# Patient Record
Sex: FEMALE | Race: WHITE | NOT HISPANIC OR LATINO | Employment: OTHER | URBAN - METROPOLITAN AREA
[De-identification: names, ages, dates, MRNs, and addresses within clinical notes are randomized per-mention and may not be internally consistent; named-entity substitution may affect disease eponyms.]

---

## 2018-08-17 ENCOUNTER — HISTORICAL (OUTPATIENT)
Dept: ADMINISTRATIVE | Facility: HOSPITAL | Age: 68
End: 2018-08-17

## 2018-08-19 LAB — FINAL CULTURE: NORMAL

## 2022-05-31 ENCOUNTER — HOSPITAL ENCOUNTER (OUTPATIENT)
Facility: HOSPITAL | Age: 72
Discharge: HOME | End: 2022-06-01
Attending: EMERGENCY MEDICINE | Admitting: EMERGENCY MEDICINE
Payer: MEDICARE

## 2022-05-31 DIAGNOSIS — I20.89 STABLE ANGINA PECTORIS: ICD-10-CM

## 2022-05-31 DIAGNOSIS — R06.02 SHORTNESS OF BREATH: ICD-10-CM

## 2022-05-31 DIAGNOSIS — R06.02 SOB (SHORTNESS OF BREATH): ICD-10-CM

## 2022-05-31 DIAGNOSIS — I20.89 ANGINAL EQUIVALENT: Primary | ICD-10-CM

## 2022-05-31 LAB
ALBUMIN SERPL-MCNC: 3.7 GM/DL (ref 3.4–4.8)
ALBUMIN/GLOB SERPL: 1.1 RATIO (ref 1.1–2)
ALP SERPL-CCNC: 148 UNIT/L (ref 40–150)
ALT SERPL-CCNC: 12 UNIT/L (ref 0–55)
AST SERPL-CCNC: 10 UNIT/L (ref 5–34)
BASOPHILS # BLD AUTO: 0.04 X10(3)/MCL (ref 0–0.2)
BASOPHILS NFR BLD AUTO: 0.4 %
BILIRUBIN DIRECT+TOT PNL SERPL-MCNC: 0.2 MG/DL
BNP BLD-MCNC: 36.5 PG/ML
BUN SERPL-MCNC: 26.8 MG/DL (ref 9.8–20.1)
CALCIUM SERPL-MCNC: 10 MG/DL (ref 8.4–10.2)
CHLORIDE SERPL-SCNC: 104 MMOL/L (ref 98–107)
CO2 SERPL-SCNC: 24 MMOL/L (ref 23–31)
CREAT SERPL-MCNC: 1.26 MG/DL (ref 0.55–1.02)
EOSINOPHIL # BLD AUTO: 0.15 X10(3)/MCL (ref 0–0.9)
EOSINOPHIL NFR BLD AUTO: 1.3 %
ERYTHROCYTE [DISTWIDTH] IN BLOOD BY AUTOMATED COUNT: 12.7 % (ref 11.5–17)
GLOBULIN SER-MCNC: 3.5 GM/DL (ref 2.4–3.5)
GLUCOSE SERPL-MCNC: 298 MG/DL (ref 82–115)
HCT VFR BLD AUTO: 45.2 % (ref 37–47)
HGB BLD-MCNC: 15 GM/DL (ref 12–16)
IMM GRANULOCYTES # BLD AUTO: 0.06 X10(3)/MCL (ref 0–0.02)
IMM GRANULOCYTES NFR BLD AUTO: 0.5 % (ref 0–0.43)
INR BLD: 0.91 (ref 0–1.3)
LYMPHOCYTES # BLD AUTO: 2.67 X10(3)/MCL (ref 0.6–4.6)
LYMPHOCYTES NFR BLD AUTO: 23.8 %
MCH RBC QN AUTO: 29.9 PG (ref 27–31)
MCHC RBC AUTO-ENTMCNC: 33.2 MG/DL (ref 33–36)
MCV RBC AUTO: 90 FL (ref 80–94)
MONOCYTES # BLD AUTO: 0.72 X10(3)/MCL (ref 0.1–1.3)
MONOCYTES NFR BLD AUTO: 6.4 %
NEUTROPHILS # BLD AUTO: 7.6 X10(3)/MCL (ref 2.1–9.2)
NEUTROPHILS NFR BLD AUTO: 67.6 %
NRBC BLD AUTO-RTO: 0 %
PLATELET # BLD AUTO: 373 X10(3)/MCL (ref 130–400)
PMV BLD AUTO: 10 FL (ref 9.4–12.4)
POCT GLUCOSE: 213 MG/DL (ref 70–110)
POTASSIUM SERPL-SCNC: 4.8 MMOL/L (ref 3.5–5.1)
PROT SERPL-MCNC: 7.2 GM/DL (ref 5.8–7.6)
PROTHROMBIN TIME: 12 SECONDS (ref 12.5–14.5)
RBC # BLD AUTO: 5.02 X10(6)/MCL (ref 4.2–5.4)
SARS-COV-2 RDRP RESP QL NAA+PROBE: NEGATIVE
SODIUM SERPL-SCNC: 138 MMOL/L (ref 136–145)
TROPONIN I SERPL-MCNC: <0.01 NG/ML (ref 0–0.04)
TROPONIN I SERPL-MCNC: <0.01 NG/ML (ref 0–0.04)
WBC # SPEC AUTO: 11.2 X10(3)/MCL (ref 4.5–11.5)

## 2022-05-31 PROCEDURE — G0378 HOSPITAL OBSERVATION PER HR: HCPCS

## 2022-05-31 PROCEDURE — 36415 COLL VENOUS BLD VENIPUNCTURE: CPT | Performed by: EMERGENCY MEDICINE

## 2022-05-31 PROCEDURE — 99285 EMERGENCY DEPT VISIT HI MDM: CPT | Mod: 25

## 2022-05-31 PROCEDURE — 63600175 PHARM REV CODE 636 W HCPCS: Performed by: EMERGENCY MEDICINE

## 2022-05-31 PROCEDURE — 85610 PROTHROMBIN TIME: CPT | Performed by: EMERGENCY MEDICINE

## 2022-05-31 PROCEDURE — 84484 ASSAY OF TROPONIN QUANT: CPT | Performed by: EMERGENCY MEDICINE

## 2022-05-31 PROCEDURE — 83880 ASSAY OF NATRIURETIC PEPTIDE: CPT | Performed by: EMERGENCY MEDICINE

## 2022-05-31 PROCEDURE — 93005 ELECTROCARDIOGRAM TRACING: CPT

## 2022-05-31 PROCEDURE — 82962 GLUCOSE BLOOD TEST: CPT

## 2022-05-31 PROCEDURE — 85025 COMPLETE CBC W/AUTO DIFF WBC: CPT | Performed by: EMERGENCY MEDICINE

## 2022-05-31 PROCEDURE — 87635 SARS-COV-2 COVID-19 AMP PRB: CPT | Performed by: EMERGENCY MEDICINE

## 2022-05-31 PROCEDURE — 96374 THER/PROPH/DIAG INJ IV PUSH: CPT

## 2022-05-31 PROCEDURE — 94761 N-INVAS EAR/PLS OXIMETRY MLT: CPT

## 2022-05-31 PROCEDURE — 80053 COMPREHEN METABOLIC PANEL: CPT | Performed by: EMERGENCY MEDICINE

## 2022-05-31 RX ORDER — LEVOCETIRIZINE DIHYDROCHLORIDE 5 MG/1
5 TABLET, FILM COATED ORAL NIGHTLY
Status: ON HOLD | COMMUNITY
Start: 2022-05-03 | End: 2022-09-14

## 2022-05-31 RX ORDER — HYDROCODONE BITARTRATE AND ACETAMINOPHEN 10; 325 MG/1; MG/1
1 TABLET ORAL 3 TIMES DAILY
Status: ON HOLD | COMMUNITY
End: 2022-09-14 | Stop reason: HOSPADM

## 2022-05-31 RX ORDER — PREGABALIN 100 MG/1
100 CAPSULE ORAL 2 TIMES DAILY
Status: ON HOLD | COMMUNITY
Start: 2022-05-08 | End: 2022-09-14 | Stop reason: SDUPTHER

## 2022-05-31 RX ORDER — SODIUM CHLORIDE 0.9 % (FLUSH) 0.9 %
10 SYRINGE (ML) INJECTION
Status: DISCONTINUED | OUTPATIENT
Start: 2022-05-31 | End: 2022-06-01 | Stop reason: HOSPADM

## 2022-05-31 RX ORDER — LEVOTHYROXINE SODIUM 175 UG/1
1 TABLET ORAL DAILY
Status: ON HOLD | COMMUNITY
Start: 2022-04-11 | End: 2022-09-14 | Stop reason: HOSPADM

## 2022-05-31 RX ORDER — RANOLAZINE 500 MG/1
500 TABLET, EXTENDED RELEASE ORAL 2 TIMES DAILY
Status: ON HOLD | COMMUNITY
Start: 2022-05-06 | End: 2022-06-01 | Stop reason: SDUPTHER

## 2022-05-31 RX ORDER — LISINOPRIL 10 MG/1
10 TABLET ORAL DAILY
Status: ON HOLD | COMMUNITY
Start: 2022-05-03 | End: 2022-09-14 | Stop reason: SDUPTHER

## 2022-05-31 RX ORDER — BUPROPION HYDROCHLORIDE 150 MG/1
1 TABLET ORAL DAILY
Status: ON HOLD | COMMUNITY
Start: 2022-03-14 | End: 2022-09-14 | Stop reason: SDUPTHER

## 2022-05-31 RX ORDER — ASPIRIN 325 MG
1 TABLET ORAL DAILY
Status: ON HOLD | COMMUNITY
End: 2022-06-01 | Stop reason: HOSPADM

## 2022-05-31 RX ORDER — AMLODIPINE BESYLATE 5 MG/1
10 TABLET ORAL DAILY
Status: ON HOLD | COMMUNITY
Start: 2022-05-24 | End: 2022-09-14 | Stop reason: HOSPADM

## 2022-05-31 RX ORDER — CLOPIDOGREL BISULFATE 75 MG/1
75 TABLET ORAL DAILY
Status: ON HOLD | COMMUNITY
Start: 2021-11-09 | End: 2022-09-14 | Stop reason: SDUPTHER

## 2022-05-31 RX ORDER — INSULIN LISPRO-AABC 100 [IU]/ML
30 INJECTION, SOLUTION SUBCUTANEOUS
Status: ON HOLD | COMMUNITY
End: 2022-09-14

## 2022-05-31 RX ORDER — INSULIN GLARGINE 300 U/ML
INJECTION, SOLUTION SUBCUTANEOUS DAILY
Status: ON HOLD | COMMUNITY
End: 2022-09-14

## 2022-05-31 RX ORDER — NABUMETONE 500 MG/1
500 TABLET, FILM COATED ORAL 2 TIMES DAILY
Status: ON HOLD | COMMUNITY
Start: 2022-03-29 | End: 2022-09-14

## 2022-05-31 RX ADMIN — INSULIN HUMAN 8 UNITS: 100 INJECTION, SOLUTION PARENTERAL at 04:05

## 2022-05-31 NOTE — ED PROVIDER NOTES
Encounter Date: 5/31/2022       History     Chief Complaint   Patient presents with    Shortness of Breath     Patient reports SOB and dizziness for 2 weeks, states has 100% blockage but cant get into [facility] until July 21, told to come to ER for evaluation, hx of 3 stents     The history is provided by the patient and a relative. No  was used.   Shortness of Breath  This is a chronic problem. The average episode lasts 3 months. The problem occurs frequently.The current episode started more than 1 week ago. The problem has been gradually worsening. Pertinent negatives include no fever, no sore throat, no chest pain and no rash. She has tried nothing for the symptoms. She has had prior hospitalizations. She has had prior ED visits. She has had no prior ICU admissions. Associated medical issues include CAD.   Pt has known  and has been referred to Dr. Sanchez, but can't get appt until 7/21/22.  States LORENZO is getting severe and has little to no exercise tolerance now.  Called [facility] office in [place] and directed to ED here for evlauation.  States she never had CP when she had her stent placed about 6 months ago, only SOB - she claims the SOB is worse now than it was then.    Review of patient's allergies indicates:   Allergen Reactions    Penicillins     Statins-hmg-coa reductase inhibitors      Past Medical History:   Diagnosis Date    Coronary artery disease     Diabetes mellitus     Hypertension     Thyroid disease      Past Surgical History:   Procedure Laterality Date    APPENDECTOMY      CHOLECYSTECTOMY      HYSTERECTOMY      TONSILLECTOMY       No family history on file.  Social History     Tobacco Use    Smoking status: Former Smoker    Smokeless tobacco: Never Used   Substance Use Topics    Alcohol use: Not Currently    Drug use: Never     Review of Systems   Constitutional: Negative for fever.   HENT: Negative for sore throat.    Respiratory: Positive for shortness of  breath.    Cardiovascular: Negative for chest pain.   Gastrointestinal: Negative for nausea.   Genitourinary: Negative for dysuria.   Musculoskeletal: Negative for back pain.   Skin: Negative for rash.   Neurological: Negative for weakness.   Hematological: Does not bruise/bleed easily.       Physical Exam     Initial Vitals [05/31/22 1412]   BP Pulse Resp Temp SpO2   124/82 87 18 98.4 °F (36.9 °C) 97 %      MAP       --         Physical Exam    Nursing note and vitals reviewed.  Constitutional: She appears well-developed and well-nourished.   HENT:   Head: Normocephalic and atraumatic.   Right Ear: External ear normal.   Left Ear: External ear normal.   Eyes: Conjunctivae and EOM are normal. Pupils are equal, round, and reactive to light.   Neck: Neck supple.   Normal range of motion.  Cardiovascular: Normal rate, regular rhythm, normal heart sounds and intact distal pulses.   Pulmonary/Chest: Breath sounds normal.   Abdominal: Abdomen is soft. Bowel sounds are normal.   Musculoskeletal:         General: Normal range of motion.      Cervical back: Normal range of motion and neck supple.     Neurological: She is alert and oriented to person, place, and time. GCS score is 15. GCS eye subscore is 4. GCS verbal subscore is 5. GCS motor subscore is 6.   Skin: Skin is warm and dry. Capillary refill takes less than 2 seconds.   Psychiatric: She has a normal mood and affect. Her behavior is normal. Judgment and thought content normal.         ED Course   Procedures  Labs Reviewed   COMPREHENSIVE METABOLIC PANEL - Abnormal; Notable for the following components:       Result Value    Glucose Level 298 (*)     Blood Urea Nitrogen 26.8 (*)     Creatinine 1.26 (*)     All other components within normal limits   PROTIME-INR - Abnormal; Notable for the following components:    PT 12.0 (*)     All other components within normal limits   CBC WITH DIFFERENTIAL - Abnormal; Notable for the following components:    IG# 0.06 (*)     IG%  0.5 (*)     All other components within normal limits   POCT GLUCOSE - Abnormal; Notable for the following components:    POCT Glucose 213 (*)     All other components within normal limits   TROPONIN I - Normal   CBC W/ AUTO DIFFERENTIAL    Narrative:     The following orders were created for panel order CBC auto differential.  Procedure                               Abnormality         Status                     ---------                               -----------         ------                     CBC with Differential[[id number]]        Abnormal            Final result                 Please view results for these tests on the individual orders.   B-TYPE NATRIURETIC PEPTIDE     EKG Readings: (Independently Interpreted)   Rhythm: Normal Sinus Rhythm. Ectopy: No Ectopy. Conduction: Normal. ST Segments: Normal ST Segments. T Waves: Normal. Axis: Normal. Clinical Impression: Normal Sinus Rhythm   Q's in inferior leads    ECG Results          EKG 12-lead (In process)  Result time 05/31/22 14:54:56    In process by Interface, Lab In [facility] (05/31/22 14:54:56)                 Narrative:    Test Reason : R06.02,    Vent. Rate : 088 BPM     Atrial Rate : 088 BPM     P-R Int : 170 ms          QRS Dur : 080 ms      QT Int : 388 ms       P-R-T Axes : 056 035 060 degrees     QTc Int : 469 ms    Normal sinus rhythm with sinus arrhythmia  Low voltage QRS  Inferior infarct ,age undetermined  Abnormal ECG  No previous ECGs available    Referred By:             Confirmed By:                             Imaging Results          X-Ray Chest AP Portable (Final result)  Result time 05/31/22 15:12:49    Final result by Emmanuel Jason MD (05/31/22 15:12:49)                 Impression:      No acute findings.      Electronically signed by: Emmanuel Jason  Date:    05/31/2022  Time:    15:12             Narrative:    EXAMINATION:  XR CHEST AP PORTABLE    CLINICAL HISTORY:  Short of breath    COMPARISON:  No priors    FINDINGS:  Portable  frontal view of the chest was obtained. Heart is not significantly enlarged.  There is aortic atherosclerosis.  The lungs are grossly clear.  There is no pneumothorax or significant effusion.  Spinal stimulator leads are noted.                                 Medications   insulin regular injection 8 Units 0.08 mL (8 Units Intravenous Given 5/31/22 1617)                   Work-up unremarkable, but it sounds as though her SOB is her angina-equivalent.  [facility] consulted - awaiting call back.    I spoke with JENNIFER Mahoney - states to admit for serial enzymes.  Will make further plans after evaluation in AM       Clinical Impression:   Final diagnoses:  [R06.02] Shortness of breath                 Rush Ingram MD  06/03/22 0613

## 2022-05-31 NOTE — PROGRESS NOTES
72-year-old female followed by Dr. Parisi in [place].  He has known history of coronary artery disease and has a chronically occluded RCA, hypertension, dyslipidemia, obstructive sleep apnea and statin intolerance.  She had a left heart catheterization September 2021 showing a significant LAD stenosis that was stented, patent obtuse marginal and the chronically occluded right coronary artery.  She was last seen May 24 for her ongoing shortness of breath.  Her shortness of breath has been progressing over the past couple of weeks which sounds like her anginal equivalent.  States she cannot walk very far at all without having symptoms.  She was referred to Dr. Sanchez on 7/21, she called the office due to worsening of her symptoms     EKG shows no acute changes  Chest x-ray is negative  Cardiac enzymes negative    Admit for observation  Continue home medications including aspirin Plavix

## 2022-06-01 VITALS
OXYGEN SATURATION: 96 % | SYSTOLIC BLOOD PRESSURE: 127 MMHG | WEIGHT: 239.63 LBS | DIASTOLIC BLOOD PRESSURE: 78 MMHG | TEMPERATURE: 98 F | HEIGHT: 63 IN | BODY MASS INDEX: 42.46 KG/M2 | RESPIRATION RATE: 23 BRPM | HEART RATE: 81 BPM

## 2022-06-01 PROBLEM — I20.89 STABLE ANGINA PECTORIS: Status: ACTIVE | Noted: 2022-06-01

## 2022-06-01 LAB
TROPONIN I SERPL-MCNC: <0.01 NG/ML (ref 0–0.04)

## 2022-06-01 PROCEDURE — 25000003 PHARM REV CODE 250: Performed by: NURSE PRACTITIONER

## 2022-06-01 PROCEDURE — 63600175 PHARM REV CODE 636 W HCPCS: Performed by: INTERNAL MEDICINE

## 2022-06-01 PROCEDURE — 36415 COLL VENOUS BLD VENIPUNCTURE: CPT | Performed by: EMERGENCY MEDICINE

## 2022-06-01 PROCEDURE — G0378 HOSPITAL OBSERVATION PER HR: HCPCS

## 2022-06-01 PROCEDURE — 93005 ELECTROCARDIOGRAM TRACING: CPT

## 2022-06-01 PROCEDURE — 96372 THER/PROPH/DIAG INJ SC/IM: CPT | Performed by: INTERNAL MEDICINE

## 2022-06-01 PROCEDURE — 84484 ASSAY OF TROPONIN QUANT: CPT | Mod: 91 | Performed by: EMERGENCY MEDICINE

## 2022-06-01 RX ORDER — IBUPROFEN 200 MG
16 TABLET ORAL
Status: DISCONTINUED | OUTPATIENT
Start: 2022-06-01 | End: 2022-06-01 | Stop reason: HOSPADM

## 2022-06-01 RX ORDER — ESOMEPRAZOLE MAGNESIUM 40 MG/1
40 CAPSULE, DELAYED RELEASE ORAL
COMMUNITY
End: 2022-06-01

## 2022-06-01 RX ORDER — METOPROLOL SUCCINATE 25 MG/1
25 TABLET, EXTENDED RELEASE ORAL DAILY
Status: DISCONTINUED | OUTPATIENT
Start: 2022-06-01 | End: 2022-06-01 | Stop reason: HOSPADM

## 2022-06-01 RX ORDER — PANTOPRAZOLE SODIUM 40 MG/1
40 TABLET, DELAYED RELEASE ORAL DAILY
Qty: 90 EACH | Refills: 3 | Status: ON HOLD | OUTPATIENT
Start: 2022-06-02 | End: 2022-09-14 | Stop reason: SDUPTHER

## 2022-06-01 RX ORDER — IBUPROFEN 200 MG
24 TABLET ORAL
Status: DISCONTINUED | OUTPATIENT
Start: 2022-06-01 | End: 2022-06-01 | Stop reason: HOSPADM

## 2022-06-01 RX ORDER — MELOXICAM 7.5 MG/1
7.5 TABLET ORAL DAILY
Status: DISCONTINUED | OUTPATIENT
Start: 2022-06-01 | End: 2022-06-01 | Stop reason: HOSPADM

## 2022-06-01 RX ORDER — RANOLAZINE 500 MG/1
1000 TABLET, EXTENDED RELEASE ORAL 2 TIMES DAILY
Status: DISCONTINUED | OUTPATIENT
Start: 2022-06-01 | End: 2022-06-01 | Stop reason: HOSPADM

## 2022-06-01 RX ORDER — BUPROPION HYDROCHLORIDE 150 MG/1
150 TABLET ORAL DAILY
Status: DISCONTINUED | OUTPATIENT
Start: 2022-06-02 | End: 2022-06-01 | Stop reason: HOSPADM

## 2022-06-01 RX ORDER — PREGABALIN 50 MG/1
100 CAPSULE ORAL 2 TIMES DAILY
Status: DISCONTINUED | OUTPATIENT
Start: 2022-06-01 | End: 2022-06-01 | Stop reason: HOSPADM

## 2022-06-01 RX ORDER — CETIRIZINE HYDROCHLORIDE 10 MG/1
10 TABLET ORAL DAILY
Status: DISCONTINUED | OUTPATIENT
Start: 2022-06-01 | End: 2022-06-01 | Stop reason: HOSPADM

## 2022-06-01 RX ORDER — LISINOPRIL 10 MG/1
10 TABLET ORAL DAILY
Status: DISCONTINUED | OUTPATIENT
Start: 2022-06-01 | End: 2022-06-01 | Stop reason: HOSPADM

## 2022-06-01 RX ORDER — INSULIN ASPART 100 [IU]/ML
1-10 INJECTION, SOLUTION INTRAVENOUS; SUBCUTANEOUS
Status: DISCONTINUED | OUTPATIENT
Start: 2022-06-01 | End: 2022-06-01 | Stop reason: HOSPADM

## 2022-06-01 RX ORDER — AMLODIPINE BESYLATE 5 MG/1
5 TABLET ORAL DAILY
Status: DISCONTINUED | OUTPATIENT
Start: 2022-06-02 | End: 2022-06-01 | Stop reason: HOSPADM

## 2022-06-01 RX ORDER — ASPIRIN 81 MG/1
81 TABLET ORAL DAILY
Qty: 30 TABLET | Refills: 11 | Status: ON HOLD | OUTPATIENT
Start: 2022-06-02 | End: 2022-09-14 | Stop reason: SDUPTHER

## 2022-06-01 RX ORDER — GLUCAGON 1 MG
1 KIT INJECTION
Status: DISCONTINUED | OUTPATIENT
Start: 2022-06-01 | End: 2022-06-01 | Stop reason: HOSPADM

## 2022-06-01 RX ORDER — CLOPIDOGREL BISULFATE 75 MG/1
75 TABLET ORAL DAILY
Status: DISCONTINUED | OUTPATIENT
Start: 2022-06-02 | End: 2022-06-01 | Stop reason: HOSPADM

## 2022-06-01 RX ORDER — METOPROLOL SUCCINATE 25 MG/1
25 TABLET, EXTENDED RELEASE ORAL DAILY
Qty: 30 TABLET | Refills: 11 | Status: ON HOLD | OUTPATIENT
Start: 2022-06-02 | End: 2022-09-14

## 2022-06-01 RX ORDER — RANOLAZINE 500 MG/1
1000 TABLET, EXTENDED RELEASE ORAL 2 TIMES DAILY
Qty: 60 TABLET | Refills: 11 | Status: ON HOLD | OUTPATIENT
Start: 2022-06-01 | End: 2022-09-14 | Stop reason: HOSPADM

## 2022-06-01 RX ORDER — ASPIRIN 81 MG/1
81 TABLET ORAL DAILY
Status: DISCONTINUED | OUTPATIENT
Start: 2022-06-01 | End: 2022-06-01 | Stop reason: HOSPADM

## 2022-06-01 RX ORDER — PANTOPRAZOLE SODIUM 40 MG/1
40 TABLET, DELAYED RELEASE ORAL DAILY
Status: DISCONTINUED | OUTPATIENT
Start: 2022-06-01 | End: 2022-06-01 | Stop reason: HOSPADM

## 2022-06-01 RX ORDER — ISOSORBIDE MONONITRATE 30 MG/1
30 TABLET, EXTENDED RELEASE ORAL DAILY
Status: DISCONTINUED | OUTPATIENT
Start: 2022-06-01 | End: 2022-06-01 | Stop reason: HOSPADM

## 2022-06-01 RX ORDER — ISOSORBIDE MONONITRATE 30 MG/1
30 TABLET, EXTENDED RELEASE ORAL DAILY
Qty: 30 TABLET | Refills: 11 | Status: ON HOLD | OUTPATIENT
Start: 2022-06-02 | End: 2022-09-14

## 2022-06-01 RX ADMIN — ASPIRIN 81 MG: 81 TABLET, COATED ORAL at 10:06

## 2022-06-01 RX ADMIN — CETIRIZINE HYDROCHLORIDE 10 MG: 10 TABLET, FILM COATED ORAL at 09:06

## 2022-06-01 RX ADMIN — MELOXICAM 7.5 MG: 7.5 TABLET ORAL at 09:06

## 2022-06-01 RX ADMIN — METOPROLOL SUCCINATE 25 MG: 25 TABLET, EXTENDED RELEASE ORAL at 10:06

## 2022-06-01 RX ADMIN — ISOSORBIDE MONONITRATE 30 MG: 30 TABLET, EXTENDED RELEASE ORAL at 09:06

## 2022-06-01 RX ADMIN — INSULIN ASPART 8 UNITS: 100 INJECTION, SOLUTION INTRAVENOUS; SUBCUTANEOUS at 11:06

## 2022-06-01 RX ADMIN — LISINOPRIL 10 MG: 10 TABLET ORAL at 09:06

## 2022-06-01 RX ADMIN — PANTOPRAZOLE SODIUM 40 MG: 40 TABLET, DELAYED RELEASE ORAL at 09:06

## 2022-06-01 RX ADMIN — RANOLAZINE 1000 MG: 500 TABLET, EXTENDED RELEASE ORAL at 09:06

## 2022-06-01 RX ADMIN — INSULIN ASPART 10 UNITS: 100 INJECTION, SOLUTION INTRAVENOUS; SUBCUTANEOUS at 03:06

## 2022-06-01 RX ADMIN — PREGABALIN 100 MG: 50 CAPSULE ORAL at 09:06

## 2022-06-01 NOTE — DISCHARGE SUMMARY
[facility] Tele[facility]  Cardiology  Discharge Summary      Patient Name: Awa Ziegler  MRN: [id number]  Admission Date: 5/31/2022  Hospital Length of Stay: 0 days  Discharge Date and Time:  06/01/2022 3:08 PM  Attending Physician: Rex Sanchez MD  Discharging Provider: CATHI Domingo  Primary Care Physician: Primary Doctor Jessica    HPI: See Same Day H&P    Hospital course: See Same day H&P. PT was placed on Medical Therapy to Medically Optimize her condition of Chronic Stable Angina with Known  of RCA. The patient ambulated in the [place] and did not have symptoms. She is stable and ready for D/C Home at this time.     Consults: None    Final Active Diagnoses:    Diagnosis Date Noted POA    PRINCIPAL PROBLEM:  Stable angina pectoris [I20.8] 06/01/2022 Yes      Problems Resolved During this Admission:       Discharged Condition: good    ROS: See Same Day H&P    Physical Exam: See Same Day H&P    Disposition: Home or Self Care    Follow Up:   Follow-up Information     Rex Sanchez MD Follow up in 1 week(s).    Specialties: Cardiovascular Disease, Cardiology  Why: Hospital Follow Up  Contact information:  [address] [facility] [facility]  [facility] [id number] [phone]                       Patient Instructions:      Activity as tolerated     Medications:  Reconciled Home Medications:      Medication List      START taking these medications    aspirin 81 MG EC tablet  Commonly known as: ECOTRIN  Take 1 tablet (81 mg total) by mouth once daily.  Start taking on: June 2, 2022  Replaces: aspirin 325 MG tablet     isosorbide mononitrate 30 MG 24 hr tablet  Commonly known as: IMDUR  Take 1 tablet (30 mg total) by mouth once daily.  Start taking on: June 2, 2022     metoprolol succinate 25 MG 24 hr tablet  Commonly known as: TOPROL-XL  Take 1 tablet (25 mg total) by mouth once daily.  Start taking on: June 2, 2022     pantoprazole 40 MG  tablet  Commonly known as: PROTONIX  Take 1 tablet (40 mg total) by mouth once daily.  Start taking on: June 2, 2022  Replaces: esomeprazole 40 MG capsule        CHANGE how you take these medications    ranolazine 500 MG Tb12  Commonly known as: RANEXA  Take 2 tablets (1,000 mg total) by mouth 2 (two) times daily.  What changed: how much to take        CONTINUE taking these medications    amLODIPine 5 MG tablet  Commonly known as: NORVASC  Take 5 mg by mouth once daily at 6am.     buPROPion 150 MG TB24 tablet  Commonly known as: WELLBUTRIN XL  Take 1 tablet by mouth once daily at 6am. FOR 90 DAYS     HYDROcodone-acetaminophen  mg per tablet  Commonly known as: NORCO  Take 1 tablet by mouth 3 (three) times daily.     levocetirizine 5 MG tablet  Commonly known as: XYZAL  Take 5 mg by mouth every evening.     levothyroxine 175 MCG tablet  Commonly known as: SYNTHROID, LEVOTHROID  Take 1 tablet by mouth once daily at 6am. On empty stomach     lisinopriL 10 MG tablet  Take 10 mg by mouth once daily.     LYUMJEV KWIKPEN U-100 INSULIN 100 unit/mL pen  Generic drug: insulin lispro-aabc  Inject 30 Units into the skin 3 (three) times daily with meals.     nabumetone 500 MG tablet  Commonly known as: RELAFEN  Take 500 mg by mouth 2 (two) times daily.     PLAVIX 75 mg tablet  Generic drug: clopidogreL  Take 75 mg by mouth once daily at 6am.     pregabalin 100 MG capsule  Commonly known as: LYRICA  Take 100 mg by mouth 2 (two) times daily.     TOUJEO MAX U-300 SOLOSTAR 300 unit/mL (3 mL) insulin pen  Generic drug: insulin glargine U-300 conc  Inject into the skin once daily.        STOP taking these medications    aspirin 325 MG tablet  Replaced by: aspirin 81 MG EC tablet     esomeprazole 40 MG capsule  Commonly known as: NEXIUM  Replaced by: pantoprazole 40 MG tablet            Impression:  Chronic Stable Angina in the setting of Known  of RCA  CAD/Stents    - [facility] (9.14.21) - L Main: Patent; LAD: Prox 90% s/p PTCA/ABIGAIL  Reduced to 0%; Mid LAD 50% Stenosis; Distal 40% Stenosis; LCx: Patent Stent; RCA: Mid % Stenosis/ with L to R Collateral Flows  HTN/HHD without Hx of HF  HLD  HUAN/CPAP  Statin Intolerance  Hx of Thyroid Cancer  DM II  No Hx of GIB    Plan:   D/C Home Today  F/U with [facility] in 1 week with Dr. Sanchez. Move up Appointment  D/C Activity as Tolerated  D/C Diet: Cardiac    F/U as per Above    Time spent on the discharge of patient: 75 minutes    Adonis Trevino, CATHI  Cardiology  [facility]

## 2022-06-01 NOTE — H&P
[facility]  Cardiology  History and Physical     Patient Name: Awa Ziegler  MRN: [id number]  Admission Date: 5/31/2022  Code Status: Full Code   Attending Provider: Rex Sanchez MD   Primary Care Physician: Primary Doctor No  Principal Problem:<principal problem not specified>    Patient information was obtained from patient and ER records.     Subjective:     Chief Complaint: CP/Known      HPI: Ms. Ziegler is a 73 y/o female who is known to [facility], Dr. Kitchen. The patient has a long-standing history of CAD/Stents and a Known  of the RCA. The patient has had multiple visits to the ER with c/o CP. Apparently she has been medically optimized with medications but has persistent SOB/LORENZO and CP Episodes. She has been referred to Dr. Sanchez for High Risk PCI to the RCA. Her appointment is not until the end of July. Her date of presentation was no different in that she developed some SOB/LORENZO at home with some L Sided CP that was 5/10 on a verbal scale. She did report associated symptoms of SOB/LORENZO. She reports that she took SL Nitro at home with minimal relief. She notified [facility] and they told her to present to ER for evaluation. Upon arrival to the ER she was worked up and her initial workup revealed a Normal Troponin and EKG with Non-Specific ST-T Wave Changes.     PMH: CAD, DM II, HTN/HHD, Thyroid Disease/Cancer, HLD, HUAN/CPAP, Statin Intolerance  PSH: Appendectomy, Cholecystectomy, Hysterectomy, Tonsillectomy, Angiogram, Rotator Cuff Repair  Family History: Father, D, 64, Lung Cancer; Tobacco Use, STEMI; Mother, D, 83, HF  Social History: Former Smoker, 1 ppd for 20+ Years; Quit in 1985; Denies Illicit Drug and ETOH Use    Previous Cardiac Diagnostics:   [facility] 9.14.21:  L Main: Patent  LAD: Prox 90% s/p PTCA/ABIGAIL Reduced to 0%; Mid LAD 50% Stenosis; Distal 40% Stenosis  LCx: Patent Stent  RCA: Mid % Stenosis/ with L to R Collateral Flows    PET 8.13.21:  This  is an abnormal perfusion study. Study is consistent with ischemia.   This scan is suggestive of moderate to high risk for future cardiovascular events.   Small reversible perfusion abnormality of mild intensity in the mid anterior segment. Small reversible perfusion abnormality of moderate intensity in the basal inferoseptal segment. Small reversible perfusion abnormality of moderate intensity in the inferior region.   Perfusion imaging is suggestive of two vessel disease. Perfusion defect is in the distribution of left anterior descending artery and right coronary artery.   The left ventricular cavity is noted to be normal on the stress studies. The stress left ventricular ejection fraction was calculated to be 69% and left ventricular global function is normal. The rest left ventricular cavity is noted to be normal. The rest left ventricular ejection fraction was calculated to be 69% and rest left ventricular global function is normal.   Compared with rest and stress studies the ejection fraction remains unchanged (69).   The study quality is excellent.   Recommend invasive assessment    ECHO 8.5.21:  The study quality is average.   The left ventricle is normal in size. Noted left ventricular hypertrophy. It is mild. Global left ventricular systolic function is hyperdynamic. The left ventricle diastolic function is impaired (Grade I) with normal left atrial pressure. The left ventricular ejection fraction is 80%.   Aortic root diameter is 3.5 cms. Ascending aorta diameter is 3.7 cms.   Mild calcification of the aortic valve is noted with adequate cuspal excursion.   Mild mitral annular calcification is noted.   Trace mitral regurgitation. Trace tricuspid regurgitation.   The pulmonary artery systolic pressure is 10 mmHg.     Carotid US 7.22.16:  The study quality is average.   No evidence of stenosis.  Antegrade right vertebral artery flow.   Antegrade left vertebral artery flow.     Review of patient's allergies  indicates:   Allergen Reactions    Penicillins     Statins-hmg-coa reductase inhibitors        No current facility-administered medications on file prior to encounter.     Current Outpatient Medications on File Prior to Encounter   Medication Sig    amLODIPine (NORVASC) 5 MG tablet Take 5 mg by mouth once daily at 6am.    aspirin 325 MG tablet Take 1 tablet by mouth once daily at 6am.    buPROPion (WELLBUTRIN XL) 150 MG TB24 tablet Take 1 tablet by mouth once daily at 6am. FOR 90 DAYS    clopidogreL (PLAVIX) 75 mg tablet Take 75 mg by mouth once daily at 6am.    HYDROcodone-acetaminophen (NORCO)  mg per tablet Take 1 tablet by mouth 3 (three) times daily.    insulin glargine U-300 conc (TOUJEO MAX U-300 SOLOSTAR) 300 unit/mL (3 mL) insulin pen Inject into the skin once daily.    insulin lispro-aabc (LYUMJEV KWIKPEN U-100 INSULIN) 100 unit/mL pen Inject 30 Units into the skin 3 (three) times daily with meals.    levocetirizine (XYZAL) 5 MG tablet Take 5 mg by mouth every evening.    levothyroxine (SYNTHROID, LEVOTHROID) 175 MCG tablet Take 1 tablet by mouth once daily at 6am. On empty stomach    lisinopriL 10 MG tablet Take 10 mg by mouth once daily.    nabumetone (RELAFEN) 500 MG tablet Take 500 mg by mouth 2 (two) times daily.    pregabalin (LYRICA) 100 MG capsule Take 100 mg by mouth 2 (two) times daily.    ranolazine (RANEXA) 500 MG Tb12 Take 500 mg by mouth 2 (two) times daily.    esomeprazole (NEXIUM) 40 MG capsule Take 40 mg by mouth before breakfast.     Family History    None       Tobacco Use    Smoking status: Former Smoker    Smokeless tobacco: Never Used   Substance and Sexual Activity    Alcohol use: Not Currently    Drug use: Never    Sexual activity: Not on file     Review of Systems   Constitutional: Positive for malaise/fatigue.   Cardiovascular: Negative for chest pain, dyspnea on exertion and syncope.   Respiratory: Negative for shortness of breath.    All other systems reviewed and are  negative.    Objective:     Vital Signs (Most Recent):  Temp: 97.7 °F (36.5 °C) (06/01/22 0416)  Pulse: 79 (06/01/22 0416)  Resp: 20 (06/01/22 0416)  BP: 119/66 (06/01/22 0416)  SpO2: (!) 93 % (06/01/22 0416) Vital Signs (24h Range):  Temp:  [97.7 °F (36.5 °C)-98.4 °F (36.9 °C)] 97.7 °F (36.5 °C)  Pulse:  [79-92] 79  Resp:  [18-23] 20  SpO2:  [93 %-97 %] 93 %  BP: (119-162)/(66-83) 119/66     Weight: 108.7 kg (239 lb 10.2 oz)  Body mass index is 42.46 kg/m².    SpO2: (!) 93 %  O2 Device (Oxygen Therapy): room air    No intake or output data in the 24 hours ending 06/01/22 0751    Lines/Drains/Airways       Peripheral Intravenous Line  Duration                  Peripheral IV - Single Lumen 05/31/22 1507 20 G Left Antecubital <1 day                    Physical Exam  Constitutional:       Appearance: Normal appearance.   HENT:      Head: Normocephalic.      Mouth/Throat:      Mouth: Mucous membranes are moist.   Eyes:      Extraocular Movements: Extraocular movements intact.   Cardiovascular:      Rate and Rhythm: Normal rate and regular rhythm.      Pulses: Normal pulses.      Heart sounds: Normal heart sounds.   Abdominal:      Palpations: Abdomen is soft.   Skin:     General: Skin is warm and dry.   Neurological:      General: No focal deficit present.      Mental Status: She is alert and oriented to person, place, and time.   Psychiatric:         Mood and Affect: Mood normal.         Behavior: Behavior normal.         Significant Labs:   Recent Labs   Lab 05/31/22  1520      K 4.8   CO2 24   BUN 26.8*   CREATININE 1.26*   CALCIUM 10.0     Recent Labs   Lab 05/31/22  1520      K 4.8   CO2 24   BUN 26.8*   CREATININE 1.26*   CALCIUM 10.0   ALBUMIN 3.7   BILITOT 0.2   ALKPHOS 148   AST 10   ALT 12   EGFRNONAA 44     Recent Labs   Lab 05/31/22  1520   WBC 11.2   HGB 15.0   HCT 45.2        Recent Labs   Lab 05/31/22  1520   INR 0.91   PROTIME 12.0*     Recent Labs   Lab 05/31/22  1520 05/31/22 2011  06/01/22  0152   TROPONINI <0.010 <0.010 <0.010     Significant Imaging: None        Assessment and Plan:   Chronic Stable Angina in the setting of Known  of RCA  CAD/Stents    - [facility] (9.14.21) - L Main: Patent; LAD: Prox 90% s/p PTCA/ABIGAIL Reduced to 0%; Mid LAD 50% Stenosis; Distal 40% Stenosis; LCx: Patent Stent; RCA: Mid % Stenosis/ with L to R Collateral Flows  HTN/HHD without Hx of HF  HLD  HUAN/CPAP  Statin Intolerance  Hx of Thyroid Cancer  DM II  No Hx of GIB    PLAN:  Resume Home Medications: Norvasc, Ranexa  Increase Ranexa to 1000mg PO BID  D/C FD ASA and Start ASA 81mg PO QDay  Start Toprol XL 25mg PO QDay  Start Imdur 30mg PO QDay  Ambulate in [place] and if PT is doing well we will D/C PT  Home with Earlier Follow Up with Dr. Sanchez    VTE Risk Mitigation (From admission, onward)           Ordered     IP VTE HIGH RISK PATIENT  Once         05/31/22 1909     Place sequential compression device  Until discontinued         05/31/22 1909                  Adonis Trevino, CATHI  Cardiology  [facility]  06/01/2022 7:51 AM

## 2022-08-21 ENCOUNTER — HOSPITAL ENCOUNTER (OUTPATIENT)
Facility: HOSPITAL | Age: 72
Discharge: NURSING, REHAB OR LONG-TERM CARE | End: 2022-09-01
Attending: EMERGENCY MEDICINE | Admitting: HOSPITALIST
Payer: MEDICARE

## 2022-08-21 DIAGNOSIS — Z79.4 TYPE 2 DIABETES MELLITUS WITHOUT COMPLICATION, WITH LONG-TERM CURRENT USE OF INSULIN: ICD-10-CM

## 2022-08-21 DIAGNOSIS — I20.89 STABLE ANGINA PECTORIS: ICD-10-CM

## 2022-08-21 DIAGNOSIS — R55 SYNCOPE AND COLLAPSE: ICD-10-CM

## 2022-08-21 DIAGNOSIS — E11.9 TYPE 2 DIABETES MELLITUS WITHOUT COMPLICATION, WITH LONG-TERM CURRENT USE OF INSULIN: ICD-10-CM

## 2022-08-21 DIAGNOSIS — I25.10 CORONARY ARTERY DISEASE, UNSPECIFIED VESSEL OR LESION TYPE, UNSPECIFIED WHETHER ANGINA PRESENT, UNSPECIFIED WHETHER NATIVE OR TRANSPLANTED HEART: ICD-10-CM

## 2022-08-21 DIAGNOSIS — R42 DIZZINESS: Primary | ICD-10-CM

## 2022-08-21 DIAGNOSIS — R53.81 PHYSICAL DECONDITIONING: ICD-10-CM

## 2022-08-21 DIAGNOSIS — I10 PRIMARY HYPERTENSION: ICD-10-CM

## 2022-08-21 DIAGNOSIS — R07.9 CHEST PAIN: ICD-10-CM

## 2022-08-21 LAB
ALBUMIN SERPL-MCNC: 3.5 GM/DL (ref 3.4–4.8)
ALBUMIN/GLOB SERPL: 1.1 RATIO (ref 1.1–2)
ALP SERPL-CCNC: 131 UNIT/L (ref 40–150)
ALT SERPL-CCNC: 13 UNIT/L (ref 0–55)
APPEARANCE UR: CLEAR
AST SERPL-CCNC: 9 UNIT/L (ref 5–34)
BACTERIA #/AREA URNS AUTO: ABNORMAL /HPF
BASOPHILS # BLD AUTO: 0.04 X10(3)/MCL (ref 0–0.2)
BASOPHILS NFR BLD AUTO: 0.3 %
BILIRUB UR QL STRIP.AUTO: NEGATIVE MG/DL
BILIRUBIN DIRECT+TOT PNL SERPL-MCNC: 0.3 MG/DL
BUN SERPL-MCNC: 21.1 MG/DL (ref 9.8–20.1)
CALCIUM SERPL-MCNC: 9.7 MG/DL (ref 8.4–10.2)
CHLORIDE SERPL-SCNC: 104 MMOL/L (ref 98–107)
CO2 SERPL-SCNC: 25 MMOL/L (ref 23–31)
COLOR UR AUTO: YELLOW
CREAT SERPL-MCNC: 1.27 MG/DL (ref 0.55–1.02)
EOSINOPHIL # BLD AUTO: 0.08 X10(3)/MCL (ref 0–0.9)
EOSINOPHIL NFR BLD AUTO: 0.7 %
ERYTHROCYTE [DISTWIDTH] IN BLOOD BY AUTOMATED COUNT: 13 % (ref 11.5–17)
GFR SERPLBLD CREATININE-BSD FMLA CKD-EPI: 44 MLS/MIN/1.73/M2
GLOBULIN SER-MCNC: 3.3 GM/DL (ref 2.4–3.5)
GLUCOSE SERPL-MCNC: 288 MG/DL (ref 82–115)
GLUCOSE UR QL STRIP.AUTO: ABNORMAL MG/DL
HCT VFR BLD AUTO: 45.5 % (ref 37–47)
HGB BLD-MCNC: 14.7 GM/DL (ref 12–16)
IMM GRANULOCYTES # BLD AUTO: 0.1 X10(3)/MCL (ref 0–0.04)
IMM GRANULOCYTES NFR BLD AUTO: 0.8 %
INR BLD: 0.98 (ref 0–1.3)
KETONES UR QL STRIP.AUTO: NEGATIVE MG/DL
LEUKOCYTE ESTERASE UR QL STRIP.AUTO: ABNORMAL UNIT/L
LYMPHOCYTES # BLD AUTO: 1.83 X10(3)/MCL (ref 0.6–4.6)
LYMPHOCYTES NFR BLD AUTO: 15 %
MCH RBC QN AUTO: 30.9 PG (ref 27–31)
MCHC RBC AUTO-ENTMCNC: 32.3 MG/DL (ref 33–36)
MCV RBC AUTO: 95.8 FL (ref 80–94)
MONOCYTES # BLD AUTO: 0.62 X10(3)/MCL (ref 0.1–1.3)
MONOCYTES NFR BLD AUTO: 5.1 %
NEUTROPHILS # BLD AUTO: 9.6 X10(3)/MCL (ref 2.1–9.2)
NEUTROPHILS NFR BLD AUTO: 78.1 %
NITRITE UR QL STRIP.AUTO: NEGATIVE
NRBC BLD AUTO-RTO: 0 %
PH UR STRIP.AUTO: 6 [PH]
PLATELET # BLD AUTO: 342 X10(3)/MCL (ref 130–400)
PMV BLD AUTO: 9.8 FL (ref 7.4–10.4)
POCT GLUCOSE: 258 MG/DL (ref 70–110)
POTASSIUM SERPL-SCNC: 5 MMOL/L (ref 3.5–5.1)
PROT SERPL-MCNC: 6.8 GM/DL (ref 5.8–7.6)
PROT UR QL STRIP.AUTO: NEGATIVE MG/DL
PROTHROMBIN TIME: 12.9 SECONDS (ref 12.5–14.5)
RBC # BLD AUTO: 4.75 X10(6)/MCL (ref 4.2–5.4)
RBC #/AREA URNS AUTO: <5 /HPF
RBC UR QL AUTO: NEGATIVE UNIT/L
SARS-COV-2 RDRP RESP QL NAA+PROBE: NEGATIVE
SODIUM SERPL-SCNC: 139 MMOL/L (ref 136–145)
SP GR UR STRIP.AUTO: 1.04 (ref 1–1.03)
SQUAMOUS #/AREA URNS AUTO: <5 /HPF
TROPONIN I SERPL-MCNC: <0.01 NG/ML (ref 0–0.04)
UROBILINOGEN UR STRIP-ACNC: 0.2 MG/DL
WBC # SPEC AUTO: 12.2 X10(3)/MCL (ref 4.5–11.5)
WBC #/AREA URNS AUTO: 23 /HPF

## 2022-08-21 PROCEDURE — 25000003 PHARM REV CODE 250: Performed by: NURSE PRACTITIONER

## 2022-08-21 PROCEDURE — 93005 ELECTROCARDIOGRAM TRACING: CPT

## 2022-08-21 PROCEDURE — 84484 ASSAY OF TROPONIN QUANT: CPT | Performed by: NURSE PRACTITIONER

## 2022-08-21 PROCEDURE — 11000001 HC ACUTE MED/SURG PRIVATE ROOM

## 2022-08-21 PROCEDURE — 96360 HYDRATION IV INFUSION INIT: CPT

## 2022-08-21 PROCEDURE — G0378 HOSPITAL OBSERVATION PER HR: HCPCS

## 2022-08-21 PROCEDURE — 82962 GLUCOSE BLOOD TEST: CPT

## 2022-08-21 PROCEDURE — 80053 COMPREHEN METABOLIC PANEL: CPT | Performed by: NURSE PRACTITIONER

## 2022-08-21 PROCEDURE — 36415 COLL VENOUS BLD VENIPUNCTURE: CPT | Performed by: NURSE PRACTITIONER

## 2022-08-21 PROCEDURE — 93010 EKG 12-LEAD: ICD-10-PCS | Mod: ,,, | Performed by: INTERNAL MEDICINE

## 2022-08-21 PROCEDURE — 99285 EMERGENCY DEPT VISIT HI MDM: CPT | Mod: 25

## 2022-08-21 PROCEDURE — 63600175 PHARM REV CODE 636 W HCPCS: Performed by: NURSE PRACTITIONER

## 2022-08-21 PROCEDURE — 85610 PROTHROMBIN TIME: CPT | Performed by: NURSE PRACTITIONER

## 2022-08-21 PROCEDURE — 93010 ELECTROCARDIOGRAM REPORT: CPT | Mod: ,,, | Performed by: INTERNAL MEDICINE

## 2022-08-21 PROCEDURE — 87635 SARS-COV-2 COVID-19 AMP PRB: CPT | Performed by: NURSE PRACTITIONER

## 2022-08-21 PROCEDURE — 81001 URINALYSIS AUTO W/SCOPE: CPT | Performed by: NURSE PRACTITIONER

## 2022-08-21 PROCEDURE — 85025 COMPLETE CBC W/AUTO DIFF WBC: CPT | Performed by: NURSE PRACTITIONER

## 2022-08-21 RX ORDER — SODIUM CHLORIDE, SODIUM LACTATE, POTASSIUM CHLORIDE, CALCIUM CHLORIDE 600; 310; 30; 20 MG/100ML; MG/100ML; MG/100ML; MG/100ML
INJECTION, SOLUTION INTRAVENOUS CONTINUOUS
Status: DISCONTINUED | OUTPATIENT
Start: 2022-08-21 | End: 2022-08-23

## 2022-08-21 RX ORDER — ACETAMINOPHEN 500 MG
1000 TABLET ORAL
Status: COMPLETED | OUTPATIENT
Start: 2022-08-21 | End: 2022-08-21

## 2022-08-21 RX ORDER — EMPAGLIFLOZIN 25 MG/1
TABLET, FILM COATED ORAL
Status: ON HOLD | COMMUNITY
End: 2022-09-14 | Stop reason: HOSPADM

## 2022-08-21 RX ORDER — BUPROPION HYDROCHLORIDE 150 MG/1
150 TABLET ORAL DAILY
Status: DISCONTINUED | OUTPATIENT
Start: 2022-08-22 | End: 2022-09-01 | Stop reason: HOSPADM

## 2022-08-21 RX ORDER — ALBUTEROL SULFATE 90 UG/1
1 AEROSOL, METERED RESPIRATORY (INHALATION) EVERY 6 HOURS PRN
Status: DISCONTINUED | OUTPATIENT
Start: 2022-08-21 | End: 2022-09-01 | Stop reason: HOSPADM

## 2022-08-21 RX ORDER — FLUTICASONE PROPIONATE 50 MCG
SPRAY, SUSPENSION (ML) NASAL
Status: ON HOLD | COMMUNITY
End: 2022-09-14 | Stop reason: HOSPADM

## 2022-08-21 RX ORDER — AMLODIPINE BESYLATE 5 MG/1
5 TABLET ORAL DAILY
Status: DISCONTINUED | OUTPATIENT
Start: 2022-08-22 | End: 2022-09-01 | Stop reason: HOSPADM

## 2022-08-21 RX ORDER — PANTOPRAZOLE SODIUM 40 MG/1
40 TABLET, DELAYED RELEASE ORAL DAILY
Status: DISCONTINUED | OUTPATIENT
Start: 2022-08-22 | End: 2022-09-01 | Stop reason: HOSPADM

## 2022-08-21 RX ORDER — DOXEPIN HYDROCHLORIDE 10 MG/1
CAPSULE ORAL
Status: ON HOLD | COMMUNITY
Start: 2021-11-09 | End: 2022-09-14 | Stop reason: HOSPADM

## 2022-08-21 RX ORDER — GLUCAGON 1 MG
1 KIT INJECTION
Status: DISCONTINUED | OUTPATIENT
Start: 2022-08-22 | End: 2022-09-01 | Stop reason: HOSPADM

## 2022-08-21 RX ORDER — ISOSORBIDE MONONITRATE 30 MG/1
30 TABLET, EXTENDED RELEASE ORAL DAILY
Status: DISCONTINUED | OUTPATIENT
Start: 2022-08-22 | End: 2022-08-26

## 2022-08-21 RX ORDER — IBUPROFEN 200 MG
16 TABLET ORAL
Status: DISCONTINUED | OUTPATIENT
Start: 2022-08-22 | End: 2022-09-01 | Stop reason: HOSPADM

## 2022-08-21 RX ORDER — RANOLAZINE 500 MG/1
1000 TABLET, EXTENDED RELEASE ORAL 2 TIMES DAILY
Status: DISCONTINUED | OUTPATIENT
Start: 2022-08-21 | End: 2022-09-01 | Stop reason: HOSPADM

## 2022-08-21 RX ORDER — CLOPIDOGREL BISULFATE 75 MG/1
75 TABLET ORAL DAILY
Status: DISCONTINUED | OUTPATIENT
Start: 2022-08-22 | End: 2022-09-01 | Stop reason: HOSPADM

## 2022-08-21 RX ORDER — IBUPROFEN 200 MG
24 TABLET ORAL
Status: DISCONTINUED | OUTPATIENT
Start: 2022-08-22 | End: 2022-09-01 | Stop reason: HOSPADM

## 2022-08-21 RX ORDER — ALBUTEROL SULFATE 90 UG/1
AEROSOL, METERED RESPIRATORY (INHALATION)
Status: ON HOLD | COMMUNITY
End: 2022-09-14

## 2022-08-21 RX ORDER — ASPIRIN 81 MG/1
81 TABLET ORAL DAILY
Status: DISCONTINUED | OUTPATIENT
Start: 2022-08-22 | End: 2022-09-01 | Stop reason: HOSPADM

## 2022-08-21 RX ORDER — GLIMEPIRIDE 4 MG/1
4 TABLET ORAL
Status: ON HOLD | COMMUNITY
Start: 2021-11-09 | End: 2022-09-14 | Stop reason: HOSPADM

## 2022-08-21 RX ORDER — METOPROLOL SUCCINATE 25 MG/1
25 TABLET, EXTENDED RELEASE ORAL DAILY
Status: DISCONTINUED | OUTPATIENT
Start: 2022-08-22 | End: 2022-08-26

## 2022-08-21 RX ORDER — INSULIN ASPART 100 [IU]/ML
1-10 INJECTION, SOLUTION INTRAVENOUS; SUBCUTANEOUS
Status: DISCONTINUED | OUTPATIENT
Start: 2022-08-22 | End: 2022-09-01 | Stop reason: HOSPADM

## 2022-08-21 RX ORDER — MECLIZINE HYDROCHLORIDE 25 MG/1
25 TABLET ORAL
Status: COMPLETED | OUTPATIENT
Start: 2022-08-21 | End: 2022-08-21

## 2022-08-21 RX ORDER — GLIMEPIRIDE 4 MG/1
4 TABLET ORAL
Status: DISCONTINUED | OUTPATIENT
Start: 2022-08-22 | End: 2022-08-30

## 2022-08-21 RX ORDER — MECLIZINE HYDROCHLORIDE 25 MG/1
25 TABLET ORAL 3 TIMES DAILY PRN
Status: DISCONTINUED | OUTPATIENT
Start: 2022-08-22 | End: 2022-09-01 | Stop reason: HOSPADM

## 2022-08-21 RX ORDER — MECLIZINE HYDROCHLORIDE 25 MG/1
25 TABLET ORAL
Status: DISCONTINUED | OUTPATIENT
Start: 2022-08-21 | End: 2022-08-21

## 2022-08-21 RX ORDER — LISINOPRIL 10 MG/1
10 TABLET ORAL DAILY
Status: DISCONTINUED | OUTPATIENT
Start: 2022-08-22 | End: 2022-08-21

## 2022-08-21 RX ORDER — ERGOCALCIFEROL 1.25 MG/1
CAPSULE ORAL
Status: ON HOLD | COMMUNITY
End: 2022-09-14

## 2022-08-21 RX ORDER — LORAZEPAM 1 MG/1
1 TABLET ORAL
Status: COMPLETED | OUTPATIENT
Start: 2022-08-21 | End: 2022-08-21

## 2022-08-21 RX ADMIN — MECLIZINE HYDROCHLORIDE 25 MG: 25 TABLET ORAL at 04:08

## 2022-08-21 RX ADMIN — SODIUM CHLORIDE, POTASSIUM CHLORIDE, SODIUM LACTATE AND CALCIUM CHLORIDE 1000 ML: 600; 310; 30; 20 INJECTION, SOLUTION INTRAVENOUS at 03:08

## 2022-08-21 RX ADMIN — SODIUM CHLORIDE, POTASSIUM CHLORIDE, SODIUM LACTATE AND CALCIUM CHLORIDE: 600; 310; 30; 20 INJECTION, SOLUTION INTRAVENOUS at 09:08

## 2022-08-21 RX ADMIN — ACETAMINOPHEN 1000 MG: 500 TABLET, FILM COATED ORAL at 04:08

## 2022-08-21 RX ADMIN — LORAZEPAM 1 MG: 1 TABLET ORAL at 04:08

## 2022-08-21 NOTE — ED PROVIDER NOTES
Encounter Date: 8/21/2022       History     Chief Complaint   Patient presents with    Dizziness     Intermittent dizziness x 1 month. Seen by ER, prescribed meclizine, no relief. AAOx4, no distress.      Patient states dizziness x 1 week. Denies any nausea, vomiting, fever, headache, blurred vision, or chest pain. States a hx. Of vertigo. States that she saw her PCP about 2 months ago for dizziness and was treated for vertigo. states that her symptoms resolved after treatment but returned last week. States that [place] [facility] was given Meclizine with no improvement and was discharged home. States that she fell twice due to dizziness at home after she was discharged so she came to the ED here. Denies any injury from falls today.         Review of patient's allergies indicates:   Allergen Reactions    Penicillins     Statins-hmg-coa reductase inhibitors      Past Medical History:   Diagnosis Date    Coronary artery disease     Diabetes mellitus     Hypertension     Thyroid disease      Past Surgical History:   Procedure Laterality Date    APPENDECTOMY      CHOLECYSTECTOMY      HYSTERECTOMY      TONSILLECTOMY       No family history on file.  Social History     Tobacco Use    Smoking status: Former Smoker    Smokeless tobacco: Never Used   Substance Use Topics    Alcohol use: Not Currently    Drug use: Never     Review of Systems   Constitutional: Negative.  Negative for chills and fever.   HENT: Negative.    Eyes: Negative.  Negative for visual disturbance.   Respiratory: Negative.    Cardiovascular: Negative.    Gastrointestinal: Negative.  Negative for nausea and vomiting.   Endocrine: Negative.    Genitourinary: Negative.    Musculoskeletal: Negative.    Skin: Negative.  Negative for rash.   Allergic/Immunologic: Negative.    Neurological: Positive for dizziness. Negative for tremors, syncope, speech difficulty, weakness, numbness and headaches.   Hematological: Negative.     Psychiatric/Behavioral: Negative.    All other systems reviewed and are negative.      Physical Exam     Initial Vitals [08/21/22 1351]   BP Pulse Resp Temp SpO2   136/74 75 18 97.9 °F (36.6 °C) 96 %      MAP       --         Physical Exam    Nursing note and vitals reviewed.  Constitutional: She appears well-developed and well-nourished. No distress.   HENT:   Head: Normocephalic and atraumatic.   Mouth/Throat: Oropharynx is clear and moist.   Eyes: Conjunctivae and EOM are normal. Pupils are equal, round, and reactive to light.   Neck: Neck supple.   Normal range of motion.  Cardiovascular: Normal rate, regular rhythm, normal heart sounds and intact distal pulses.   Pulmonary/Chest: Breath sounds normal. No respiratory distress. She has no wheezes.   Abdominal: Abdomen is soft. She exhibits no distension. There is no abdominal tenderness.   Musculoskeletal:         General: No tenderness or edema. Normal range of motion.      Cervical back: Normal range of motion and neck supple.     Neurological: She is alert and oriented to person, place, and time. She has normal strength. GCS score is 15. GCS eye subscore is 4. GCS verbal subscore is 5. GCS motor subscore is 6.   Skin: Skin is warm and dry. No rash noted.   Psychiatric: She has a normal mood and affect. Thought content normal.         ED Course   Procedures  Labs Reviewed   COMPREHENSIVE METABOLIC PANEL - Abnormal; Notable for the following components:       Result Value    Glucose Level 288 (*)     Blood Urea Nitrogen 21.1 (*)     Creatinine 1.27 (*)     All other components within normal limits   CBC WITH DIFFERENTIAL - Abnormal; Notable for the following components:    WBC 12.2 (*)     MCV 95.8 (*)     MCHC 32.3 (*)     Neut # 9.6 (*)     IG# 0.10 (*)     All other components within normal limits   URINALYSIS, REFLEX TO URINE CULTURE - Abnormal; Notable for the following components:    Specific Gravity, UA 1.038 (*)     Glucose, UA 3+ (*)     Leukocyte  Esterase, UA 2+ (*)     All other components within normal limits   URINALYSIS, MICROSCOPIC - Abnormal; Notable for the following components:    WBC, UA 23 (*)     All other components within normal limits   POCT GLUCOSE - Abnormal; Notable for the following components:    POCT Glucose 258 (*)     All other components within normal limits   TROPONIN I - Normal   PROTIME-INR - Normal   CULTURE, URINE   CBC W/ AUTO DIFFERENTIAL    Narrative:     The following orders were created for panel order CBC Auto Differential.  Procedure                               Abnormality         Status                     ---------                               -----------         ------                     CBC with Differential[[id number]]        Abnormal            Final result                 Please view results for these tests on the individual orders.   SARS-COV-2 RNA AMPLIFICATION, QUAL   POCT GLUCOSE, HAND-HELD DEVICE     EKG Readings: (Independently Interpreted)   Initial Reading: No STEMI. Rhythm: Normal Sinus Rhythm. Heart Rate: 72. Ectopy: No Ectopy. Conduction: RBBB. ST Segments: Normal ST Segments. T Waves: Normal. Axis: Normal. Clinical Impression: Normal Sinus Rhythm with RBBB     ECG Results          EKG 12-lead (Final result)  Result time 08/21/22 15:28:04    Final result by Interface, Lab [facility] (08/21/22 15:28:04)                 Narrative:    Test Reason : R07.9,    Vent. Rate : 072 BPM     Atrial Rate : 072 BPM     P-R Int : 196 ms          QRS Dur : 126 ms      QT Int : 452 ms       P-R-T Axes : 059 038 037 degrees     QTc Int : 494 ms    Normal sinus rhythm with sinus arrhythmia  Right bundle branch block  Possible Inferior infarct (cited on or before 31-MAY-2022)  Abnormal ECG  Confirmed by Harpal Alicea MD ([id number]) on 8/21/2022 3:27:55 PM    Referred By:             Confirmed By:Harpal Alicea MD                            Imaging Results          CT Head Without Contrast (Final result)  Result time 08/21/22 14:40:38     Final result by Elvis Kang MD (08/21/22 14:40:38)                 Impression:      1.  No acute intracranial findings identified.    2.  Chronic microangiopathic ischemia and atrophy.      Electronically signed by: Elvis Kang  Date:    08/21/2022  Time:    14:40             Narrative:    EXAMINATION:  CT HEAD WITHOUT CONTRAST    CLINICAL HISTORY:  Dizziness, persistent/recurrent, cardiac or vascular cause suspected;    TECHNIQUE:  Sequential axial images were performed of the brain without contrast.    Dose product length of 1229 mGycm. Automated exposure control was utilized to minimize radiation dose.    COMPARISON:  None available.    FINDINGS:  There is no intracranial mass effect, midline shift, hydrocephalus or hemorrhage. There is no sulcal effacement or low attenuation changes to suggest recent large vessel territory infarction. Chronic appearing periventricular and subcortical white matter low attenuation changes are present and are consistent with chronic microangiopathic ischemia. The ventricular system and sulcal markings prominence is consistent with atrophy. There is no acute extra axial fluid collection. Visualized paranasal sinuses are clear without mucosal thickening, polypoidal abnormality or air-fluid levels. Mastoid air cells aeration is optimal.                                 Medications   meclizine tablet 25 mg (25 mg Oral Not Given 8/21/22 1615)   meclizine tablet 25 mg (25 mg Oral Given 8/21/22 1603)   lactated ringers bolus 1,000 mL (0 mLs Intravenous Stopped 8/21/22 1655)   LORazepam tablet 1 mg (1 mg Oral Given 8/21/22 1603)   acetaminophen tablet 1,000 mg (1,000 mg Oral Given 8/21/22 1603)     Medical Decision Making:   Initial Assessment:   Patient is awake, alert, nontoxic appearing, afebrile, and neurovascular intact in the ED.  Differential Diagnosis:   Dizziness, Vertigo  Clinical Tests:   Lab Tests: Ordered and Reviewed  Radiological Study: Ordered and Reviewed  ED  Management:  Discussed patient with Dr. Herron and we will admit patient for further evaluation and stroke work up due to dizziness with falling and no improvement with Meclizine or other medications.   Other:   I have discussed this case with another health care provider.       <> Summary of the Discussion: - Spoke with LEYLA Reyes with the hospitalist and ok to admit to service.             Attending Attestation:     Physician Attestation Statement for NP/PA:   I have conducted a face to face encounter with this patient in addition to the NP/PA, due to Medical Complexity    Other NP/PA Attestation Additions:    History of Present Illness: 71 yo F with history of vertigo, HTN, DM, presents for dizziness and feeling unsteady while walking resulting in 2 GLF earlier today.  No resulting head trauma or headache.  She denies any focal weakness, numbness or vision changes.  She was seen at an outside ED earlier today given meclizine and discharged to home, however she does not feel improved and presents here tonight.   Physical Exam: Awake, alert, and oriented x4, even, unlabored respirations.  Cranial nerves 2-12 intact without persistent nystagmus.  5/5 strength in the upper and lower extremities bilaterally with intact and symmetric sensation.  Patient feeling too unsteady to test gait.   Medical Decision Makin-year-old female with history of peripheral vertigo here for dizziness and gait instability, no relief from meclizine.  Prior to my examination she has received additional meclizine and Ativan and states she is overall feeling improved but does not feel comfortable walking.  She does live alone and is afraid she will fall.  There is also some concern for stroke considering persistent symptoms.  New focal neurologic deficits appreciated on my exam at this time, however again gait not tested.  Labs and CT scan of the head reviewed.  She is not a candidate for tPA due to onset of symptoms the earlier in  the week.  Hospital medicine consulted for admission.                      Clinical Impression:   Final diagnoses:  [R07.9] Chest pain  [R42] Dizziness (Primary)          ED Disposition Condition    Admit               EDWARD Anna  08/21/22 1141       Gloria Herron MD  08/22/22 2782

## 2022-08-22 LAB
POCT GLUCOSE: 172 MG/DL (ref 70–110)
POCT GLUCOSE: 210 MG/DL (ref 70–110)
POCT GLUCOSE: 242 MG/DL (ref 70–110)
POCT GLUCOSE: 263 MG/DL (ref 70–110)

## 2022-08-22 PROCEDURE — 96372 THER/PROPH/DIAG INJ SC/IM: CPT | Performed by: NURSE PRACTITIONER

## 2022-08-22 PROCEDURE — 25000003 PHARM REV CODE 250: Performed by: INTERNAL MEDICINE

## 2022-08-22 PROCEDURE — G0378 HOSPITAL OBSERVATION PER HR: HCPCS

## 2022-08-22 PROCEDURE — 63600175 PHARM REV CODE 636 W HCPCS: Performed by: NURSE PRACTITIONER

## 2022-08-22 PROCEDURE — 25000003 PHARM REV CODE 250: Performed by: NURSE PRACTITIONER

## 2022-08-22 PROCEDURE — 97163 PT EVAL HIGH COMPLEX 45 MIN: CPT

## 2022-08-22 PROCEDURE — 82962 GLUCOSE BLOOD TEST: CPT

## 2022-08-22 RX ORDER — PREGABALIN 50 MG/1
100 CAPSULE ORAL
Status: DISCONTINUED | OUTPATIENT
Start: 2022-08-23 | End: 2022-08-24

## 2022-08-22 RX ORDER — HYDROCODONE BITARTRATE AND ACETAMINOPHEN 10; 325 MG/1; MG/1
1 TABLET ORAL EVERY 6 HOURS PRN
Status: DISCONTINUED | OUTPATIENT
Start: 2022-08-22 | End: 2022-09-01 | Stop reason: HOSPADM

## 2022-08-22 RX ORDER — DOXEPIN HYDROCHLORIDE 10 MG/1
20 CAPSULE ORAL NIGHTLY
Status: DISCONTINUED | OUTPATIENT
Start: 2022-08-22 | End: 2022-09-01 | Stop reason: HOSPADM

## 2022-08-22 RX ADMIN — LEVOTHYROXINE SODIUM 175 MCG: 150 TABLET ORAL at 06:08

## 2022-08-22 RX ADMIN — BUPROPION HYDROCHLORIDE 150 MG: 150 TABLET, FILM COATED, EXTENDED RELEASE ORAL at 06:08

## 2022-08-22 RX ADMIN — RANOLAZINE 1000 MG: 500 TABLET, EXTENDED RELEASE ORAL at 09:08

## 2022-08-22 RX ADMIN — PANTOPRAZOLE SODIUM 40 MG: 40 TABLET, DELAYED RELEASE ORAL at 09:08

## 2022-08-22 RX ADMIN — RANOLAZINE 1000 MG: 500 TABLET, EXTENDED RELEASE ORAL at 08:08

## 2022-08-22 RX ADMIN — ASPIRIN 81 MG: 81 TABLET, COATED ORAL at 09:08

## 2022-08-22 RX ADMIN — MECLIZINE HYDROCHLORIDE 25 MG: 25 TABLET ORAL at 08:08

## 2022-08-22 RX ADMIN — AMLODIPINE BESYLATE 5 MG: 5 TABLET ORAL at 06:08

## 2022-08-22 RX ADMIN — MECLIZINE HYDROCHLORIDE 25 MG: 25 TABLET ORAL at 09:08

## 2022-08-22 RX ADMIN — DOXEPIN HYDROCHLORIDE 20 MG: 10 CAPSULE ORAL at 08:08

## 2022-08-22 RX ADMIN — INSULIN ASPART 6 UNITS: 100 INJECTION, SOLUTION INTRAVENOUS; SUBCUTANEOUS at 05:08

## 2022-08-22 RX ADMIN — INSULIN ASPART 2 UNITS: 100 INJECTION, SOLUTION INTRAVENOUS; SUBCUTANEOUS at 09:08

## 2022-08-22 RX ADMIN — GLIMEPIRIDE 4 MG: 4 TABLET ORAL at 06:08

## 2022-08-22 RX ADMIN — CLOPIDOGREL 75 MG: 75 TABLET, FILM COATED ORAL at 06:08

## 2022-08-22 RX ADMIN — HYDROCODONE BITARTRATE AND ACETAMINOPHEN 1 TABLET: 10; 325 TABLET ORAL at 08:08

## 2022-08-22 NOTE — PLAN OF CARE
ROGEL letter explained to patient via phone call, denies questions, emailed letter to IVONNE Fofana for delivery, JUAN F Letter loaded in EMR.
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
## 2022-08-22 NOTE — PLAN OF CARE
08/22/22 1514   Discharge Assessment   Assessment Type Discharge Planning Assessment   Confirmed/corrected address, phone number and insurance Yes   Confirmed Demographics Correct on Facesheet   Source of Information patient   When was your last doctors appointment?   (Patient reports the 11th of last month.)   Does patient/caregiver understand observation status Yes   Communicated REVA with patient/caregiver Yes   Reason For Admission Syncope And Collapse   Lives With alone   Do you expect to return to your current living situation? Yes   Do you have help at home or someone to help you manage your care at home? Yes   Who are your caregiver(s) and their phone number(s)? Daughter: Carmelita Mcelroy ([phone]).   Prior to hospitilization cognitive status: Unable to Assess   Current cognitive status: Alert/Oriented   Walking or Climbing Stairs Difficulty ambulation difficulty, requires equipment   Dressing/Bathing Difficulty none   Home Accessibility wheelchair accessible   Home Layout Able to live on 1st floor   Equipment Currently Used at Home rollator;shower chair;wheelchair;walker, standard;walker, rolling;glucometer   Readmission within 30 days? No   Patient currently being followed by outpatient case management? No   Do you currently have service(s) that help you manage your care at home? No   Do you take prescription medications? Yes   Do you have prescription coverage? Yes   Do you have any problems affording any of your prescribed medications? No   Who is going to help you get home at discharge? Patient's daughterCarmelita   How do you get to doctors appointments? car, drives self   Are you on dialysis? No   Do you take coumadin? No   Discharge Plan A Home Health   Discharge Plan B Home   DME Needed Upon Discharge  none   Discharge Plan discussed with: Patient   Discharge Barriers Identified None     Patient would like to resume services with Codie REYES at discharge. DANIELLA will sent clinicals to [facility] via  Debi

## 2022-08-22 NOTE — PLAN OF CARE
Problem: Physical Therapy  Goal: Physical Therapy Goal  Description: Goals to be met by: 22     Patient will increase functional independence with mobility by performin. Supine to sit with Stand-by Assistance  2. Sit to supine with Stand-by Assistance  3. Sit to stand transfer with Stand-by Assistance  4. Gait  x 100 feet with Stand-by Assistance using Rolling Walker.     Outcome: Ongoing, Progressing
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
## 2022-08-22 NOTE — H&P
"[facility] Hospital Medicine   History & Physical Note      Patient Name: Awa Ziegler  : 1950  MRN: [id number]  Patient Class: IP- Inpatient   Admission Date: 2022   Length of Stay: 0  Admitting Service: Hospital Medicine  Attending Physician: Dr. Trevino  PCP: Va Durán MD  Source of history: Patient, patient's family, and EMR.   Code status: Full      Chief Complaint   Dizziness (Intermittent dizziness x 1 month. Seen by ER, prescribed meclizine, no relief. AAOx4, no distress. )      History of Present Illness   Mr. Ziegler is a a 72-year-old female with history of vertigo, HTN, IDDM2 hypothyroidism presents to the ED with c/o dizziness with two ground level falls today.  Patient with a history of vertigo and was treated for this approximately 2 weeks ago states " they fixed the rocks in my ears" with resolution of symptoms however symptoms returned a few days ago and was seen this morning at [facility] and was given antivert with minimal improvement and discharged home.  When she went home her dizziness continued and she had 2 ground level falls with no head trauma or loss of consciousness and she return to the ED for further evaluation.  She denies unilateral weakness, headache, vision changes, history of TIA or CVA. She does have a non-functioning spinal cord stimulator and is unable to get MRI.     Upon arrival into the ED patient remained hemodynamically stable and afebrile with an SpO2 of 96% on room air.  Labs remarkable for an TJEA, mild leukocytosis of 12,200 with a left shift, UA with 2+ leuk esterase, 23 wbc's with no bacteria. She denies urinary symptoms and has chronic urinary incontinence. CT of her head was negative for acute intracranial findings.  She was given meclizine with improvement in her symptoms, however she was still unsteady on her feet and lives alone therefore Hospitalist Service asked to admit pt for observation.    Pt seen and examined. " "She states "I feel 98% better". At baseline, she ambulates with a walker and lives alone.  She was able to ambulate with assistance however states that she feels too unstable to go home alone because she is scared she is going to fall, explained to her that we will continue the meclizine as needed and likely discharge her home in the morning pending tonight events.      ROS   Except as documented, all other systems reviewed and negative     Past Medical History   · Essential HTN  · Hypothyroidism  · IDDM2   · HLD  · Vertigo  · Urinary Incontinence      Past Surgical History     Past Surgical History:   Procedure Laterality Date    APPENDECTOMY      CHOLECYSTECTOMY      HYSTERECTOMY      TONSILLECTOMY         Social History     Social History     Tobacco Use    Smoking status: Former Smoker    Smokeless tobacco: Never Used   Substance Use Topics    Alcohol use: Not Currently        Family History   · Reviewed and noncontributory    Allergies   Penicillins and Statins-hmg-coa reductase inhibitors    Home Medications     Medication Sig   amLODIPine (NORVASC) 5 MG tablet Take 5 mg by mouth once daily at 6am.   aspirin (ECOTRIN) 81 MG EC tablet Take 1 tablet (81 mg total) by mouth once daily.   buPROPion (WELLBUTRIN XL) 150 MG TB24 tablet Take 1 tablet by mouth once daily at 6am. FOR 90 DAYS   clopidogreL (PLAVIX) 75 mg tablet Take 75 mg by mouth once daily at 6am.   doxepin (SINEQUAN) 10 MG capsule doxepin 10 mg capsule   TAKE 2 CAPSULES BY MOUTH EVERY NIGHT AT BEDTIME   glimepiride (AMARYL) 4 MG tablet Take 4 mg by mouth.   HYDROcodone-acetaminophen (NORCO)  mg per tablet Take 1 tablet by mouth 3 (three) times daily.   insulin glargine U-300 conc (TOUJEO MAX U-300 SOLOSTAR) 300 unit/mL (3 mL) insulin pen Inject into the skin once daily.   levocetirizine (XYZAL) 5 MG tablet Take 5 mg by mouth every evening.   levothyroxine (SYNTHROID, LEVOTHROID) 175 MCG tablet Take 1 tablet by mouth once daily at 6am. On " empty stomach   lisinopriL 10 MG tablet Take 10 mg by mouth once daily.   nabumetone (RELAFEN) 500 MG tablet Take 500 mg by mouth 2 (two) times daily.   pantoprazole (PROTONIX) 40 MG tablet Take 1 tablet (40 mg total) by mouth once daily.   pregabalin (LYRICA) 100 MG capsule Take 100 mg by mouth 2 (two) times daily.   ranolazine (RANEXA) 500 MG Tb12 Take 2 tablets (1,000 mg total) by mouth 2 (two) times daily.   albuterol (PROVENTIL/VENTOLIN HFA) 90 mcg/actuation inhaler albuterol sulfate HFA 90 mcg/actuation aerosol inhaler   empagliflozin (JARDIANCE) 25 mg tablet Take by mouth.   ergocalciferol (ERGOCALCIFEROL) 50,000 unit Cap ergocalciferol (vitamin D2) 1,250 mcg (50,000 unit) capsule   fluticasone propionate (FLONASE) 50 mcg/actuation nasal spray fluticasone propionate 50 mcg/actuation nasal spray,suspension   insulin lispro-aabc (LYUMJEV KWIKPEN U-100 INSULIN) 100 unit/mL pen Inject 30 Units into the skin 3 (three) times daily with meals.   isosorbide mononitrate (IMDUR) 30 MG 24 hr tablet Take 1 tablet (30 mg total) by mouth once daily.   metoprolol succinate (TOPROL-XL) 25 MG 24 hr tablet Take 1 tablet (25 mg total) by mouth once daily.   ranitidine (ZANTAC) 75 MG tablet ranitidine HCl Take No date recorded No form recorded No frequency recorded No route recorded No set duration recorded No set duration amount recorded suspended No dosage strength recorded No dosage strength units of measure recorded   esomeprazole (NEXIUM) 40 MG capsule Take 40 mg by mouth before breakfast.        Inpatient Medications   Scheduled Meds   [START ON 8/22/2022] amLODIPine  5 mg Oral Daily    [START ON 8/22/2022] aspirin  81 mg Oral Daily    [START ON 8/22/2022] buPROPion  150 mg Oral Daily    [START ON 8/22/2022] clopidogreL  75 mg Oral Daily    [START ON 8/22/2022] glimepiride  4 mg Oral Daily with breakfast    [START ON 8/22/2022] isosorbide mononitrate  30 mg Oral Daily    [START ON 8/22/2022] levothyroxine  175 mcg  Oral Daily    [START ON 8/22/2022] lisinopriL  10 mg Oral Daily    [START ON 8/22/2022] metoprolol succinate  25 mg Oral Daily    [START ON 8/22/2022] pantoprazole  40 mg Oral Daily    ranolazine  1,000 mg Oral BID     Continuous Infusions   lactated ringers       PRN Meds  albuterol    Physical Exam   Vital Signs  Temp:  [97.9 °F (36.6 °C)]   Pulse:  [69-75]   Resp:  [14-21]   BP: (130-141)/(63-74)   SpO2:  [96 %-98 %]       General: Appears comfortable  HEENT: NC/AT  Neck:  No JVD  Chest: CTABL  CVS: Regular rhythm. Normal S1/S2.  Abdomen: nondistended, normoactive BS, soft and non-tender.  MSK: No obvious deformity or joint swelling  Skin: Warm and dry  Neuro: AAOx3, no focal neurological deficit  Psych: Cooperative    Labs     Recent Labs     08/21/22  1433   WBC 12.2*   RBC 4.75   HGB 14.7   HCT 45.5   MCV 95.8*   MCH 30.9   MCHC 32.3*   RDW 13.0        No results for input(s): LACTIC in the last 72 hours.  Recent Labs     08/21/22  1433   INR 0.98     No results for input(s): HGBA1C, CHOL, TRIG, LDL, VLDL, HDL in the last 72 hours.   Recent Labs     08/21/22  1433      K 5.0   CHLORIDE 104   CO2 25   BUN 21.1*   CREATININE 1.27*   GLUCOSE 288*   CALCIUM 9.7   ALBUMIN 3.5   GLOBULIN 3.3   ALKPHOS 131   ALT 13   AST 9   BILITOT 0.3     Recent Labs     08/21/22  1433   TROPONINI <0.010          Microbiology Results (last 7 days)     Procedure Component Value Units Date/Time    Urine culture [[id number]] Collected: 08/21/22 1657    Order Status: Sent Specimen: Urine Updated: 08/21/22 3300         Imaging     CT Head Without Contrast   Final Result      1.  No acute intracranial findings identified.      2.  Chronic microangiopathic ischemia and atrophy.         Electronically signed by: Elvis Kang   Date:    08/21/2022   Time:    14:40      MRI Brain Without Contrast    (Results Pending)     Assessment & Plan   1. Benign positional Vertigo- resolved  2. Acute Kidney Injury  3. Urinary  Leukocytes  4. Essential HTN  5. Hypothyrodism  6. IDDM2       PLAN:  - LR @ 125cc/hr. Avoid nephrotoxic agents  - Meclizine PRN vertigo  - UC pending. Hold off on abx at this time.   - Home meds reviewed and resumed  - ISS  - can likely be discharged home in the morning if stable overnight.  - VTE Prophylaxis:    I, Jessica Bond, ELIZABETH have discussed this patients case with Dr. Trevino who agrees with the diagnosis and treatment plan.      __________________________________________________________________  I, Dr. Rush Trevino assumed care of this patient.  For the patient encounter, I performed the substantive portion of the visit, I reviewed the NPPA documentation, treatment plan, and medical decision making.  I had face to face time with this patient.  I have personally reviewed the labs and test results that are presently available. I have reviewed or attempted to review medical records based upon their availability. If patient was admitted under observational status it is with my approval.      Patient is a 72-year-old female who presented with dizziness and reported falls today.  She has a history of vertigo and states this is similar to her prior episodes.  Patient uncomfortable going home even though she has had significant improvement with meclizine.  Attempts to stand at bedside she felt slightly unsteady.  Likely by morning she can be discharged if she is able to ambulate safely.  Continue meclizine.    Time seen: 11PM   Rush Trevino MD

## 2022-08-23 LAB
BACTERIA UR CULT: NORMAL
POCT GLUCOSE: 167 MG/DL (ref 70–110)
POCT GLUCOSE: 180 MG/DL (ref 70–110)
POCT GLUCOSE: 192 MG/DL (ref 70–110)
POCT GLUCOSE: 281 MG/DL (ref 70–110)

## 2022-08-23 PROCEDURE — 97116 GAIT TRAINING THERAPY: CPT

## 2022-08-23 PROCEDURE — 25000003 PHARM REV CODE 250: Performed by: NURSE PRACTITIONER

## 2022-08-23 PROCEDURE — 63600175 PHARM REV CODE 636 W HCPCS: Performed by: NURSE PRACTITIONER

## 2022-08-23 PROCEDURE — 96361 HYDRATE IV INFUSION ADD-ON: CPT

## 2022-08-23 PROCEDURE — 25000003 PHARM REV CODE 250: Performed by: INTERNAL MEDICINE

## 2022-08-23 PROCEDURE — 96372 THER/PROPH/DIAG INJ SC/IM: CPT | Performed by: NURSE PRACTITIONER

## 2022-08-23 PROCEDURE — G0378 HOSPITAL OBSERVATION PER HR: HCPCS

## 2022-08-23 RX ADMIN — PANTOPRAZOLE SODIUM 40 MG: 40 TABLET, DELAYED RELEASE ORAL at 09:08

## 2022-08-23 RX ADMIN — DOXEPIN HYDROCHLORIDE 20 MG: 10 CAPSULE ORAL at 08:08

## 2022-08-23 RX ADMIN — MECLIZINE HYDROCHLORIDE 25 MG: 25 TABLET ORAL at 09:08

## 2022-08-23 RX ADMIN — RANOLAZINE 1000 MG: 500 TABLET, EXTENDED RELEASE ORAL at 08:08

## 2022-08-23 RX ADMIN — CLOPIDOGREL 75 MG: 75 TABLET, FILM COATED ORAL at 06:08

## 2022-08-23 RX ADMIN — BUPROPION HYDROCHLORIDE 150 MG: 150 TABLET, FILM COATED, EXTENDED RELEASE ORAL at 06:08

## 2022-08-23 RX ADMIN — PREGABALIN 100 MG: 50 CAPSULE ORAL at 08:08

## 2022-08-23 RX ADMIN — RANOLAZINE 1000 MG: 500 TABLET, EXTENDED RELEASE ORAL at 09:08

## 2022-08-23 RX ADMIN — LEVOTHYROXINE SODIUM 175 MCG: 150 TABLET ORAL at 06:08

## 2022-08-23 RX ADMIN — GLIMEPIRIDE 4 MG: 4 TABLET ORAL at 06:08

## 2022-08-23 RX ADMIN — SODIUM CHLORIDE, POTASSIUM CHLORIDE, SODIUM LACTATE AND CALCIUM CHLORIDE: 600; 310; 30; 20 INJECTION, SOLUTION INTRAVENOUS at 09:08

## 2022-08-23 RX ADMIN — ASPIRIN 81 MG: 81 TABLET, COATED ORAL at 09:08

## 2022-08-23 RX ADMIN — MECLIZINE HYDROCHLORIDE 25 MG: 25 TABLET ORAL at 08:08

## 2022-08-23 RX ADMIN — HYDROCODONE BITARTRATE AND ACETAMINOPHEN 1 TABLET: 10; 325 TABLET ORAL at 03:08

## 2022-08-23 RX ADMIN — INSULIN ASPART 6 UNITS: 100 INJECTION, SOLUTION INTRAVENOUS; SUBCUTANEOUS at 06:08

## 2022-08-23 RX ADMIN — AMLODIPINE BESYLATE 5 MG: 5 TABLET ORAL at 06:08

## 2022-08-23 RX ADMIN — INSULIN ASPART 1 UNITS: 100 INJECTION, SOLUTION INTRAVENOUS; SUBCUTANEOUS at 08:08

## 2022-08-23 NOTE — PLAN OF CARE
Sw followed up with patient following PT/OT evaluation. Patient would like SW to focus on the [place] area first. SW obtained consent for rehab services and sent a referral to [facility] ([phone]) via [facility].

## 2022-08-23 NOTE — PLAN OF CARE
Problem: Adult Inpatient Plan of Care  Goal: Plan of Care Review  Outcome: Ongoing, Progressing  Flowsheets (Taken 8/22/2022 2242)  Plan of Care Reviewed With: patient  Goal: Patient-Specific Goal (Individualized)  Outcome: Ongoing, Progressing  Goal: Absence of Hospital-Acquired Illness or Injury  Outcome: Ongoing, Progressing  Intervention: Identify and Manage Fall Risk  Flowsheets (Taken 8/22/2022 2242)  Safety Promotion/Fall Prevention:   assistive device/personal item within reach   side rails raised x 2  Intervention: Prevent Skin Injury  Flowsheets (Taken 8/22/2022 2242)  Body Position: position changed independently  Skin Protection: adhesive use limited  Intervention: Prevent and Manage VTE (Venous Thromboembolism) Risk  Flowsheets (Taken 8/22/2022 2242)  Activity Management: Rolling - L1  VTE Prevention/Management: remove, assess skin, and reapply sequential compression device  Range of Motion:   active ROM (range of motion) encouraged   ROM (range of motion) performed  Goal: Optimal Comfort and Wellbeing  Outcome: Ongoing, Progressing  Intervention: Monitor Pain and Promote Comfort  Flowsheets (Taken 8/22/2022 2242)  Pain Management Interventions: position adjusted  Intervention: Provide Person-Centered Care  Flowsheets (Taken 8/22/2022 2242)  Trust Relationship/Rapport: care explained  Goal: Readiness for Transition of Care  Outcome: Ongoing, Progressing

## 2022-08-23 NOTE — PROGRESS NOTES
"[facility] Hospital Medicine Progress Note        Chief Complaint: Inpatient Follow-up for Dizziness    HPI:   Mr. Ziegler is a a 72-year-old white female with history of vertigo, hypertension, insulin-dependent type 2 diabetes mellitus, hypothyroidism who presents to the ED with complaints of dizziness with two ground level falls today.  Patient has a history of vertigo and was treated for this approximately 2 weeks ago.  She states " they fixed the rocks in my ears" with resolution of symptoms however symptoms returned a few days ago and she was [place] [facility] and was given  meclizine  with minimal improvement and discharged home.  When she went home her dizziness continued and she had 2 ground level falls with no head trauma or loss of consciousness and  so she returned to the ED for further evaluation.  She denies unilateral weakness, headache, vision changes, history of transient ischemic attack or stroke. She has a non-functioning spinal cord stimulator and is unable to get MRI.      Upon arrival into the ED patient remained hemodynamically stable and afebrile with an oxygen saturation of 96% on room air.  Labs were remarkable for an elevated serum creatinine, mild leukocytosis of 12,200 with a left shift, urinalysis with 2+ leuk esterase, 23 wbc's with no bacteria. She denies urinary symptoms and has chronic urinary incontinence. CT of the head was negative for acute intracranial findings.  She was given meclizine with improvement in her symptoms, however she was still unsteady on her feet and lives alone therefore Hospitalist Service asked to very promptly admitted the patient for observation.       Interval Hx:   Patient awake and comfortable. Oriented x 3 and following all verbal commands. No fever or chills. Ambulating with PTx and has no dizziness.     Objective/physical exam:  General: In no acute distress, afebrile, obese   Chest: Clear to auscultation " bilaterally  Heart: RRR, +S1, S2, no appreciable murmur  Abdomen: Soft, nontender, BS +  MSK: Warm, no lower extremity edema, no clubbing or cyanosis  Neurologic: Alert and oriented x4, Cranial nerve II-XII intact, Strength 5/5 in all 4 extremities    VITAL SIGNS: 24 HRS MIN & MAX LAST   Temp  Min: 97.6 °F (36.4 °C)  Max: 98.8 °F (37.1 °C) 97.6 °F (36.4 °C)   BP  Min: 124/70  Max: 146/78 (!) 146/78   Pulse  Min: 65  Max: 75  69   Resp  Min: 17  Max: 18 18   SpO2  Min: 95 %  Max: 98 % 96 %       Recent Labs   Lab 08/21/22  1433   WBC 12.2*   RBC 4.75   HGB 14.7   HCT 45.5   MCV 95.8*   MCH 30.9   MCHC 32.3*   RDW 13.0      MPV 9.8       Recent Labs   Lab 08/21/22  1433      K 5.0   CO2 25   BUN 21.1*   CREATININE 1.27*   CALCIUM 9.7   ALBUMIN 3.5   ALKPHOS 131   ALT 13   AST 9   BILITOT 0.3          Microbiology Results (last 7 days)     Procedure Component Value Units Date/Time    Urine culture [[id number]] Collected: 08/21/22 1657    Order Status: Completed Specimen: Urine Updated: 08/23/22 0941     Urine Culture No Significant Growth           See below for Radiology    Scheduled Med:   amLODIPine  5 mg Oral Daily    aspirin  81 mg Oral Daily    buPROPion  150 mg Oral Daily    clopidogreL  75 mg Oral Daily    doxepin  20 mg Oral QHS    glimepiride  4 mg Oral Daily with breakfast    isosorbide mononitrate  30 mg Oral Daily    levothyroxine  175 mcg Oral Daily    metoprolol succinate  25 mg Oral Daily    pantoprazole  40 mg Oral Daily    ranolazine  1,000 mg Oral BID        Continuous Infusions:       PRN Meds:  albuterol, dextrose 10%, dextrose 10%, glucagon (human recombinant), glucose, glucose, HYDROcodone-acetaminophen, insulin aspart U-100, meclizine, pregabalin       Assessment/Plan:  Vertigo   Anxiety disorder  Essential hypertension  Insulin-dependent type 2 diabetes mellitus   Hypothyroidism    Plan:  Patient now doing well   Cont PTx   Labs and vitals stable  Dc iv fluids   Continue  strict aspiration, fall and decubitus precautions     CM consulted for rehab placement     VTE prophylaxis: SCDs    Patient condition:  Fair    Anticipated discharge and Disposition:         All diagnosis and differential diagnosis have been reviewed; assessment and plan has been documented; I have personally reviewed the labs and test results that are presently available; I have reviewed the patients medication list; I have reviewed the consulting providers response and recommendations. I have reviewed or attempted to review medical records based upon their availability    All of the patient's questions have been  addressed and answered. Patient's is agreeable to the above stated plan. I will continue to monitor closely and make adjustments to medical management as needed.  _____________________________________________________________________    Nutrition Status:    Radiology:  CT Head Without Contrast  Narrative: EXAMINATION:  CT HEAD WITHOUT CONTRAST    CLINICAL HISTORY:  Dizziness, persistent/recurrent, cardiac or vascular cause suspected;    TECHNIQUE:  Sequential axial images were performed of the brain without contrast.    Dose product length of 1229 mGycm. Automated exposure control was utilized to minimize radiation dose.    COMPARISON:  None available.    FINDINGS:  There is no intracranial mass effect, midline shift, hydrocephalus or hemorrhage. There is no sulcal effacement or low attenuation changes to suggest recent large vessel territory infarction. Chronic appearing periventricular and subcortical white matter low attenuation changes are present and are consistent with chronic microangiopathic ischemia. The ventricular system and sulcal markings prominence is consistent with atrophy. There is no acute extra axial fluid collection. Visualized paranasal sinuses are clear without mucosal thickening, polypoidal abnormality or air-fluid levels. Mastoid air cells aeration is optimal.  Impression: 1.  No  acute intracranial findings identified.    2.  Chronic microangiopathic ischemia and atrophy.    Electronically signed by: Elvis Kang  Date:    08/21/2022  Time:    14:40      Iggy Resendiz MD   08/23/2022

## 2022-08-23 NOTE — CLINICAL REVIEW
72-year-old female admitted on 08/21/2022 with dizziness.  History of vertigo, hypertension, diabetes mellitus.  She was started on overall supportive care under an observation setting.  Her dizziness  resolved by 08/23/2022.  As of 08/29/2022, she remains hemodynamically stable, afebrile.  No active comorbidities.  Placement is being sought.  There is no further episodes of dizziness.  No evidence of acute stroke/vertebral insufficiency, hemodynamic instability, hypoxia, hypercapnia, food intolerance, medication intolerance, electrolytes requiring inpatient monitoring, infection, bleeding.  Able to tolerate physical therapy.  No clear severity of illness to transition her to a higher level of care. Remains appropriate for observation setting.        Deborah Armenta MD  Utilization Management  Physician Advisor

## 2022-08-23 NOTE — PT/OT/SLP PROGRESS
Physical Therapy  Treatment    Awa Ziegler   MRN: [id number]   Admitting Diagnosis: Dizziness    PT Received On: 08/23/22  PT Start Time: 1530     PT Stop Time: 1545    PT Total Time (min): 15 min       Billable Minutes:  Gait Training 15    Treatment Type: Treatment  PT/PTA: PT     PTA Visit Number: 1       General Precautions: Standard,    Respiratory Status: Room air    Spiritual, Cultural Beliefs, Jain Practices, Values that Affect Care: no    Subjective:  Communicated with RN prior to session.    Functional Mobility:  Pt performed sup>sit w/ mod A for safety. Sitting balance, SBA; sit<>std w/ RW, mod A. Pt ambulated about 60' w/ RW, min A for safety; bouts of running into things on L side. Pt returned to sitting EOB w/ all needs in reach.      AM-PAC 6 CLICK MOBILITY  How much help from another person does this patient currently need?   1 = Unable, Total/Dependent Assistance  2 = A lot, Maximum/Moderate Assistance  3 = A little, Minimum/Contact Guard/Supervision  4 = None, Modified Donovan/Independent         AM-PAC Raw Score CMS G-Code Modifier Level of Impairment Assistance   6 % Total / Unable   7 - 9 CM 80 - 100% Maximal Assist   10 - 14 CL 60 - 80% Moderate Assist   15 - 19 CK 40 - 60% Moderate Assist   20 - 22 CJ 20 - 40% Minimal Assist   23 CI 1-20% SBA / CGA   24 CH 0% Independent/ Mod I     Patient left sitting edge of bed with all lines intact.    Assessment:  Awa Ziegler is a 72 y.o. female showed significant improvement today in mobility and activity tolerance. Pt is motivated to get stronger and wants to go to rehab prior to going home. I think this is the appropriate DC option considering pt lives alone and has had multiple falls in the past few weeks. RN notified.    Rehab identified problem list/impairments: Rehab identified problem list/impairments: weakness, impaired endurance, gait instability, impaired balance    Rehab potential is good.    Activity tolerance:  Good    Discharge recommendations: Discharge Facility/Level of Care Needs: rehabilitation facility       GOALS:   Multidisciplinary Problems     Physical Therapy Goals        Problem: Physical Therapy    Goal Priority Disciplines Outcome Goal Variances Interventions   Physical Therapy Goal     PT, PT/OT Ongoing, Progressing     Description: Goals to be met by: 22     Patient will increase functional independence with mobility by performin. Supine to sit with Stand-by Assistance  2. Sit to supine with Stand-by Assistance  3. Sit to stand transfer with Stand-by Assistance  4. Gait  x 100 feet with Stand-by Assistance using Rolling Walker.                      PLAN:    Patient to be seen daily  to address the above listed problems via gait training, therapeutic activities, therapeutic exercises  Plan of Care expires: 22  Plan of Care reviewed with: patient         2022

## 2022-08-24 LAB
ANION GAP SERPL CALC-SCNC: 12 MEQ/L
BUN SERPL-MCNC: 20.8 MG/DL (ref 9.8–20.1)
CALCIUM SERPL-MCNC: 9.2 MG/DL (ref 8.4–10.2)
CHLORIDE SERPL-SCNC: 107 MMOL/L (ref 98–107)
CO2 SERPL-SCNC: 20 MMOL/L (ref 23–31)
CREAT SERPL-MCNC: 0.82 MG/DL (ref 0.55–1.02)
CREAT/UREA NIT SERPL: 25
EST. AVERAGE GLUCOSE BLD GHB EST-MCNC: 191.5 MG/DL
GFR SERPLBLD CREATININE-BSD FMLA CKD-EPI: >60 MLS/MIN/1.73/M2
GLUCOSE SERPL-MCNC: 220 MG/DL (ref 82–115)
GLUCOSE SERPL-MCNC: 246 MG/DL (ref 70–110)
HBA1C MFR BLD: 8.3 %
POCT GLUCOSE: 195 MG/DL (ref 70–110)
POCT GLUCOSE: 235 MG/DL (ref 70–110)
POTASSIUM SERPL-SCNC: 4.3 MMOL/L (ref 3.5–5.1)
SODIUM SERPL-SCNC: 139 MMOL/L (ref 136–145)

## 2022-08-24 PROCEDURE — 25000003 PHARM REV CODE 250: Performed by: NURSE PRACTITIONER

## 2022-08-24 PROCEDURE — 96372 THER/PROPH/DIAG INJ SC/IM: CPT | Performed by: INTERNAL MEDICINE

## 2022-08-24 PROCEDURE — 96372 THER/PROPH/DIAG INJ SC/IM: CPT | Performed by: NURSE PRACTITIONER

## 2022-08-24 PROCEDURE — 80048 BASIC METABOLIC PNL TOTAL CA: CPT | Performed by: INTERNAL MEDICINE

## 2022-08-24 PROCEDURE — 25000003 PHARM REV CODE 250: Performed by: INTERNAL MEDICINE

## 2022-08-24 PROCEDURE — 63600175 PHARM REV CODE 636 W HCPCS: Performed by: INTERNAL MEDICINE

## 2022-08-24 PROCEDURE — 83036 HEMOGLOBIN GLYCOSYLATED A1C: CPT | Performed by: INTERNAL MEDICINE

## 2022-08-24 PROCEDURE — 36415 COLL VENOUS BLD VENIPUNCTURE: CPT | Performed by: INTERNAL MEDICINE

## 2022-08-24 PROCEDURE — 63600175 PHARM REV CODE 636 W HCPCS: Performed by: NURSE PRACTITIONER

## 2022-08-24 PROCEDURE — 97116 GAIT TRAINING THERAPY: CPT

## 2022-08-24 PROCEDURE — G0378 HOSPITAL OBSERVATION PER HR: HCPCS

## 2022-08-24 RX ORDER — POLYETHYLENE GLYCOL 3350 17 G/17G
17 POWDER, FOR SOLUTION ORAL DAILY
Status: DISCONTINUED | OUTPATIENT
Start: 2022-08-24 | End: 2022-09-01 | Stop reason: HOSPADM

## 2022-08-24 RX ORDER — PREGABALIN 50 MG/1
100 CAPSULE ORAL 2 TIMES DAILY PRN
Status: DISCONTINUED | OUTPATIENT
Start: 2022-08-24 | End: 2022-09-01 | Stop reason: HOSPADM

## 2022-08-24 RX ADMIN — MECLIZINE HYDROCHLORIDE 25 MG: 25 TABLET ORAL at 09:08

## 2022-08-24 RX ADMIN — GLIMEPIRIDE 4 MG: 4 TABLET ORAL at 05:08

## 2022-08-24 RX ADMIN — RANOLAZINE 1000 MG: 500 TABLET, EXTENDED RELEASE ORAL at 08:08

## 2022-08-24 RX ADMIN — AMLODIPINE BESYLATE 5 MG: 5 TABLET ORAL at 05:08

## 2022-08-24 RX ADMIN — PANTOPRAZOLE SODIUM 40 MG: 40 TABLET, DELAYED RELEASE ORAL at 08:08

## 2022-08-24 RX ADMIN — INSULIN DETEMIR 15 UNITS: 100 INJECTION, SOLUTION SUBCUTANEOUS at 09:08

## 2022-08-24 RX ADMIN — ASPIRIN 81 MG: 81 TABLET, COATED ORAL at 08:08

## 2022-08-24 RX ADMIN — ISOSORBIDE MONONITRATE 30 MG: 30 TABLET, EXTENDED RELEASE ORAL at 08:08

## 2022-08-24 RX ADMIN — METOPROLOL SUCCINATE 25 MG: 25 TABLET, EXTENDED RELEASE ORAL at 08:08

## 2022-08-24 RX ADMIN — CLOPIDOGREL 75 MG: 75 TABLET, FILM COATED ORAL at 05:08

## 2022-08-24 RX ADMIN — INSULIN ASPART 2 UNITS: 100 INJECTION, SOLUTION INTRAVENOUS; SUBCUTANEOUS at 09:08

## 2022-08-24 RX ADMIN — LEVOTHYROXINE SODIUM 175 MCG: 150 TABLET ORAL at 05:08

## 2022-08-24 RX ADMIN — HYDROCODONE BITARTRATE AND ACETAMINOPHEN 1 TABLET: 10; 325 TABLET ORAL at 12:08

## 2022-08-24 RX ADMIN — POLYETHYLENE GLYCOL 3350 17 G: 17 POWDER, FOR SOLUTION ORAL at 08:08

## 2022-08-24 RX ADMIN — RANOLAZINE 1000 MG: 500 TABLET, EXTENDED RELEASE ORAL at 09:08

## 2022-08-24 RX ADMIN — HYDROCODONE BITARTRATE AND ACETAMINOPHEN 1 TABLET: 10; 325 TABLET ORAL at 09:08

## 2022-08-24 RX ADMIN — MECLIZINE HYDROCHLORIDE 25 MG: 25 TABLET ORAL at 03:08

## 2022-08-24 RX ADMIN — MECLIZINE HYDROCHLORIDE 25 MG: 25 TABLET ORAL at 08:08

## 2022-08-24 RX ADMIN — BUPROPION HYDROCHLORIDE 150 MG: 150 TABLET, FILM COATED, EXTENDED RELEASE ORAL at 05:08

## 2022-08-24 RX ADMIN — INSULIN ASPART 4 UNITS: 100 INJECTION, SOLUTION INTRAVENOUS; SUBCUTANEOUS at 06:08

## 2022-08-24 NOTE — PT/OT/SLP PROGRESS
Physical Therapy Treatment    Patient Name:  Awa Ziegler   MRN:  [id number]    Recommendations:     Discharge Recommendations:  rehabilitation facility, other (see comments) (pt needs rehab, however if not approved pt would benefit from SNF or swing bed)   Discharge Equipment Recommendations: walker, rolling   Barriers to discharge: high fall risk. Pt lives alone, has a high chance of falling and being readmitted to the hospital    Assessment:     Awa Ziegler is a 72 y.o. female admitted with a medical diagnosis of Dizziness, weakness, multiple falls at home.  She presents with the following impairments/functional limitations:  weakness, impaired endurance, impaired self care skills, impaired functional mobility, impaired balance. Pt requiring mod assist to mobilize and remains a high fall risk. Needs continued therapy services.    Rehab Prognosis: Good; patient would benefit from acute skilled PT services to address these deficits and reach maximum level of function.    Recent Surgery: * No surgery found *      Plan:     During this hospitalization, patient to be seen daily to address the identified rehab impairments via gait training, therapeutic activities, therapeutic exercises and progress toward the following goals:    · Plan of Care Expires:  09/24/22    Subjective     Chief Complaint: scared to go home  Patient/Family Comments/goals: improved strength, balance, and safety      Objective:     Communicated with RN prior to session.  Patient found HOB elevated upon PT entry to room.     General Precautions: Standard,     Respiratory Status: Room air     Functional Mobility:  · Bed Mobility:     · Supine to Sit: moderate assistance  · Gait: 20 ft w RW mod assist for satefy. Pt running into things on the L and fatigues quickly  · Balance: pt has poor dynamic standing balance and reuires modA to prevent falling      Patient left up in chair with all lines intact and call button in reach..    GOALS:    Multidisciplinary Problems     Physical Therapy Goals        Problem: Physical Therapy    Goal Priority Disciplines Outcome Goal Variances Interventions   Physical Therapy Goal     PT, PT/OT Ongoing, Progressing     Description: Goals to be met by: 22     Patient will increase functional independence with mobility by performin. Supine to sit with Stand-by Assistance  2. Sit to supine with Stand-by Assistance  3. Sit to stand transfer with Stand-by Assistance  4. Gait  x 100 feet with Stand-by Assistance using Rolling Walker.                      Time Tracking:     PT Received On: 22  PT Start Time: 1510     PT Stop Time: 1525  PT Total Time (min): 15 min     Billable Minutes: Gait Training 15    Treatment Type: Treatment  PT/PTA: PT     PTA Visit Number: 2     2022

## 2022-08-24 NOTE — PLAN OF CARE
"DANIELLA received a call from [facility] who reports the following "No, unable to accept patient  Reason: Inappropriate Level of Care Comments: Patient is in observation status so she does not meet the inpatient criteria for rehab. Suggest SNF since she has no one to help care for her for possible long term placement". DANIELLA updated patient of the decline status. DANIELLA and Dr. Rueda did meet with patient's daughter, Carmelita Mcelroy (cell:  [phone]/work:[phone]) and discussed patient discharge plan. Carmelita was informed that  will attempt rehab placement followed by SNF then Home with home health. Patient's daughter would like DANIELLA to send referrals to facilities in the [facility] followed by Joanne holloway.     Dr. Rueda made clear to patient's daughter that patient's observation status along with her medical condition may not meet rehab criteria at this time.  "

## 2022-08-24 NOTE — PROGRESS NOTES
"[facility] Hospital Medicine Progress Note        Chief Complaint: Inpatient Follow-up for Dizziness    HPI:   Mr. Ziegler is a a 72-year-old white female with history of vertigo, hypertension, insulin-dependent type 2 diabetes mellitus, hypothyroidism who presents to the ED with complaints of dizziness with two ground level falls today.  Patient has a history of vertigo and was treated for this approximately 2 weeks ago.  She states " they fixed the rocks in my ears" with resolution of symptoms however symptoms returned a few days ago and she was seen this morning at [facility] and was given  meclizine  with minimal improvement and discharged home.  When she went home her dizziness continued and she had 2 ground level falls with no head trauma or loss of consciousness and  so she returned to the ED for further evaluation.  She denies unilateral weakness, headache, vision changes, history of transient ischemic attack or stroke. She has a non-functioning spinal cord stimulator and is unable to get MRI.      Upon arrival into the ED patient remained hemodynamically stable and afebrile with an oxygen saturation of 96% on room air.  Labs were remarkable for an elevated serum creatinine, mild leukocytosis of 12,200 with a left shift, urinalysis with 2+ leuk esterase, 23 wbc's with no bacteria. She denies urinary symptoms and has chronic urinary incontinence. CT of the head was negative for acute intracranial findings.  She was given meclizine with improvement in her symptoms, however she was still unsteady on her feet and lives alone therefore Hospitalist Service asked to very promptly admitted the patient for observation. PTx was started and she was making a good clinical recovery.        Interval Hx:   Patient awake and comfortable. Has no new issues. No dizziness today. Eating well. Participating with PTx.     Objective/physical exam:  General: In no acute distress, afebrile, obese   Chest: " Clear to auscultation bilaterally  Heart: RRR, +S1, S2, no appreciable murmur  Abdomen: Soft, nontender, BS +  MSK: Warm, no lower extremity edema, no clubbing or cyanosis  Neurologic: Alert and oriented x4, Cranial nerve II-XII intact, Strength 5/5 in all 4 extremities    VITAL SIGNS: 24 HRS MIN & MAX LAST   Temp  Min: 97.5 °F (36.4 °C)  Max: 98.1 °F (36.7 °C) 98.1 °F (36.7 °C)   BP  Min: 116/74  Max: 165/85 136/72   Pulse  Min: 67  Max: 76  72   Resp  Min: 16  Max: 18 18   SpO2  Min: 93 %  Max: 97 % 96 %       Recent Labs   Lab 08/21/22  1433   WBC 12.2*   RBC 4.75   HGB 14.7   HCT 45.5   MCV 95.8*   MCH 30.9   MCHC 32.3*   RDW 13.0      MPV 9.8       Recent Labs   Lab 08/21/22  1433 08/24/22  0406    139   K 5.0 4.3   CO2 25 20*   BUN 21.1* 20.8*   CREATININE 1.27* 0.82   CALCIUM 9.7 9.2   ALBUMIN 3.5  --    ALKPHOS 131  --    ALT 13  --    AST 9  --    BILITOT 0.3  --           Microbiology Results (last 7 days)     Procedure Component Value Units Date/Time    Urine culture [[id number]] Collected: 08/21/22 1657    Order Status: Completed Specimen: Urine Updated: 08/23/22 0941     Urine Culture No Significant Growth           See below for Radiology    Scheduled Med:   amLODIPine  5 mg Oral Daily    aspirin  81 mg Oral Daily    buPROPion  150 mg Oral Daily    clopidogreL  75 mg Oral Daily    doxepin  20 mg Oral QHS    glimepiride  4 mg Oral Daily with breakfast    isosorbide mononitrate  30 mg Oral Daily    levothyroxine  175 mcg Oral Daily    metoprolol succinate  25 mg Oral Daily    pantoprazole  40 mg Oral Daily    polyethylene glycol  17 g Oral Daily    ranolazine  1,000 mg Oral BID        Continuous Infusions:       PRN Meds:  albuterol, dextrose 10%, dextrose 10%, glucagon (human recombinant), glucose, glucose, HYDROcodone-acetaminophen, insulin aspart U-100, meclizine, pregabalin       Assessment/Plan:  Vertigo   Anxiety disorder  Essential hypertension  Insulin-dependent type 2  diabetes mellitus   Hypothyroidism    Plan:  Patient now doing well   Cont PTx   Blood sugars stable   HbA1c 8, will add levemir 15 units sq q hs     Continue strict aspiration, fall and decubitus precautions     CM working on rehab vs SNF per family request     VTE prophylaxis: SCDs    Patient condition:  Fair    Anticipated discharge and Disposition:         All diagnosis and differential diagnosis have been reviewed; assessment and plan has been documented; I have personally reviewed the labs and test results that are presently available; I have reviewed the patients medication list; I have reviewed the consulting providers response and recommendations. I have reviewed or attempted to review medical records based upon their availability    All of the patient's questions have been  addressed and answered. Patient's is agreeable to the above stated plan. I will continue to monitor closely and make adjustments to medical management as needed.  _____________________________________________________________________    Nutrition Status:    Radiology:  CT Head Without Contrast  Narrative: EXAMINATION:  CT HEAD WITHOUT CONTRAST    CLINICAL HISTORY:  Dizziness, persistent/recurrent, cardiac or vascular cause suspected;    TECHNIQUE:  Sequential axial images were performed of the brain without contrast.    Dose product length of 1229 mGycm. Automated exposure control was utilized to minimize radiation dose.    COMPARISON:  None available.    FINDINGS:  There is no intracranial mass effect, midline shift, hydrocephalus or hemorrhage. There is no sulcal effacement or low attenuation changes to suggest recent large vessel territory infarction. Chronic appearing periventricular and subcortical white matter low attenuation changes are present and are consistent with chronic microangiopathic ischemia. The ventricular system and sulcal markings prominence is consistent with atrophy. There is no acute extra axial fluid collection.  Visualized paranasal sinuses are clear without mucosal thickening, polypoidal abnormality or air-fluid levels. Mastoid air cells aeration is optimal.  Impression: 1.  No acute intracranial findings identified.    2.  Chronic microangiopathic ischemia and atrophy.    Electronically signed by: Elvis Kang  Date:    08/21/2022  Time:    14:40      Iggy Resendiz MD   08/24/2022

## 2022-08-24 NOTE — PLAN OF CARE
Problem: Adult Inpatient Plan of Care  Goal: Plan of Care Review  Outcome: Ongoing, Progressing  Flowsheets (Taken 8/23/2022 2223)  Plan of Care Reviewed With: patient  Goal: Patient-Specific Goal (Individualized)  Outcome: Ongoing, Progressing  Goal: Absence of Hospital-Acquired Illness or Injury  Outcome: Ongoing, Progressing  Intervention: Identify and Manage Fall Risk  Flowsheets (Taken 8/23/2022 2223)  Safety Promotion/Fall Prevention:   assistive device/personal item within reach   side rails raised x 2  Intervention: Prevent Skin Injury  Flowsheets (Taken 8/23/2022 2223)  Body Position: position changed independently  Skin Protection: adhesive use limited  Intervention: Prevent and Manage VTE (Venous Thromboembolism) Risk  Flowsheets (Taken 8/23/2022 2223)  Activity Management: Rolling - L1  VTE Prevention/Management: ambulation promoted  Range of Motion: active ROM (range of motion) encouraged  Goal: Optimal Comfort and Wellbeing  Outcome: Ongoing, Progressing  Intervention: Monitor Pain and Promote Comfort  Flowsheets (Taken 8/23/2022 2223)  Pain Management Interventions:   pain management plan reviewed with patient/caregiver   position adjusted  Intervention: Provide Person-Centered Care  Flowsheets (Taken 8/23/2022 2223)  Trust Relationship/Rapport: care explained  Goal: Readiness for Transition of Care  Outcome: Ongoing, Progressing     Problem: Skin Injury Risk Increased  Goal: Skin Health and Integrity  Outcome: Ongoing, Progressing  Intervention: Optimize Skin Protection  Flowsheets (Taken 8/23/2022 2223)  Skin Protection: adhesive use limited  Head of Bed (HOB) Positioning: HOB at 15 degrees

## 2022-08-25 LAB
POCT GLUCOSE: 194 MG/DL (ref 70–110)
POCT GLUCOSE: 223 MG/DL (ref 70–110)
POCT GLUCOSE: 241 MG/DL (ref 70–110)
POCT GLUCOSE: 314 MG/DL (ref 70–110)

## 2022-08-25 PROCEDURE — 97116 GAIT TRAINING THERAPY: CPT

## 2022-08-25 PROCEDURE — 25000003 PHARM REV CODE 250: Performed by: NURSE PRACTITIONER

## 2022-08-25 PROCEDURE — G0378 HOSPITAL OBSERVATION PER HR: HCPCS

## 2022-08-25 PROCEDURE — 96372 THER/PROPH/DIAG INJ SC/IM: CPT | Performed by: NURSE PRACTITIONER

## 2022-08-25 PROCEDURE — 25000003 PHARM REV CODE 250: Performed by: INTERNAL MEDICINE

## 2022-08-25 PROCEDURE — 63600175 PHARM REV CODE 636 W HCPCS: Performed by: INTERNAL MEDICINE

## 2022-08-25 PROCEDURE — 63600175 PHARM REV CODE 636 W HCPCS: Performed by: NURSE PRACTITIONER

## 2022-08-25 PROCEDURE — 97166 OT EVAL MOD COMPLEX 45 MIN: CPT

## 2022-08-25 PROCEDURE — 96372 THER/PROPH/DIAG INJ SC/IM: CPT | Performed by: INTERNAL MEDICINE

## 2022-08-25 RX ADMIN — HYDROCODONE BITARTRATE AND ACETAMINOPHEN 1 TABLET: 10; 325 TABLET ORAL at 08:08

## 2022-08-25 RX ADMIN — MECLIZINE HYDROCHLORIDE 25 MG: 25 TABLET ORAL at 09:08

## 2022-08-25 RX ADMIN — POLYETHYLENE GLYCOL 3350 17 G: 17 POWDER, FOR SOLUTION ORAL at 09:08

## 2022-08-25 RX ADMIN — INSULIN ASPART 2 UNITS: 100 INJECTION, SOLUTION INTRAVENOUS; SUBCUTANEOUS at 06:08

## 2022-08-25 RX ADMIN — MECLIZINE HYDROCHLORIDE 25 MG: 25 TABLET ORAL at 08:08

## 2022-08-25 RX ADMIN — PREGABALIN 100 MG: 50 CAPSULE ORAL at 08:08

## 2022-08-25 RX ADMIN — BUPROPION HYDROCHLORIDE 150 MG: 150 TABLET, FILM COATED, EXTENDED RELEASE ORAL at 05:08

## 2022-08-25 RX ADMIN — RANOLAZINE 1000 MG: 500 TABLET, EXTENDED RELEASE ORAL at 09:08

## 2022-08-25 RX ADMIN — AMLODIPINE BESYLATE 5 MG: 5 TABLET ORAL at 05:08

## 2022-08-25 RX ADMIN — RANOLAZINE 1000 MG: 500 TABLET, EXTENDED RELEASE ORAL at 08:08

## 2022-08-25 RX ADMIN — METOPROLOL SUCCINATE 25 MG: 25 TABLET, EXTENDED RELEASE ORAL at 09:08

## 2022-08-25 RX ADMIN — CLOPIDOGREL 75 MG: 75 TABLET, FILM COATED ORAL at 05:08

## 2022-08-25 RX ADMIN — ISOSORBIDE MONONITRATE 30 MG: 30 TABLET, EXTENDED RELEASE ORAL at 09:08

## 2022-08-25 RX ADMIN — INSULIN ASPART 4 UNITS: 100 INJECTION, SOLUTION INTRAVENOUS; SUBCUTANEOUS at 08:08

## 2022-08-25 RX ADMIN — PANTOPRAZOLE SODIUM 40 MG: 40 TABLET, DELAYED RELEASE ORAL at 09:08

## 2022-08-25 RX ADMIN — INSULIN DETEMIR 20 UNITS: 100 INJECTION, SOLUTION SUBCUTANEOUS at 08:08

## 2022-08-25 RX ADMIN — LEVOTHYROXINE SODIUM 175 MCG: 150 TABLET ORAL at 05:08

## 2022-08-25 RX ADMIN — HYDROCODONE BITARTRATE AND ACETAMINOPHEN 1 TABLET: 10; 325 TABLET ORAL at 02:08

## 2022-08-25 RX ADMIN — GLIMEPIRIDE 4 MG: 4 TABLET ORAL at 05:08

## 2022-08-25 NOTE — PT/OT/SLP PROGRESS
Physical Therapy Treatment    Patient Name:  Awa Ziegler   MRN:  [id number]    Recommendations:     Discharge Recommendations:  rehabilitation facility   Discharge Equipment Recommendations: walker, rolling   Barriers to discharge: fall risk    Assessment:     Awa Zielger is a 72 y.o. female admitted with a medical diagnosis of Dizziness, vertigo, and multiple falls.  She presents with the following impairments/functional limitations:  weakness, impaired endurance, impaired functional mobility, impaired self care skills, gait instability, impaired balance, decreased lower extremity function.  Awa has improved her gait speed and step length but remains a fall risk d/t balance impairments.      Rehab Prognosis: Good; patient would benefit from acute skilled PT services to address these deficits and reach maximum level of function.    Recent Surgery: * No surgery found *      Plan:     During this hospitalization, patient to be seen daily to address the identified rehab impairments via gait training, therapeutic activities, therapeutic exercises and progress toward the following goals:    · Plan of Care Expires:  09/25/22    Subjective     Chief Complaint: scared to go home and fall  Patient/Family Comments/goals: to get stronger and prevent falls    Objective:     Communicated with RN prior to session.  Patient found HOB elevated with   upon PT entry to room.     General Precautions: Standard,     Orthopedic Precautions:    Braces:    Respiratory Status: Room air     Functional Mobility:  · Bed Mobility:     · Supine to Sit: moderate assistance  · Gait: 70 ft w RW mod assist for satefy.  Pt fatigued after.  · Balance: pt has poor dynamic standing balance and reuires modA to prevent falling    Patient left up in chair with all lines intact and call button in reach.    GOALS:   Multidisciplinary Problems     Physical Therapy Goals        Problem: Physical Therapy    Goal Priority Disciplines Outcome Goal  Variances Interventions   Physical Therapy Goal     PT, PT/OT Ongoing, Progressing     Description: Goals to be met by: 22     Patient will increase functional independence with mobility by performin. Supine to sit with Stand-by Assistance  2. Sit to supine with Stand-by Assistance  3. Sit to stand transfer with Stand-by Assistance  4. Gait  x 100 feet with Stand-by Assistance using Rolling Walker.                      Time Tracking:     PT Received On: 22  PT Start Time: 1140     PT Stop Time: 1210  PT Total Time (min): 30 min     Billable Minutes: Gait Training 30    Treatment Type: Treatment  PT/PTA: PT     PTA Visit Number: 3     2022

## 2022-08-25 NOTE — PLAN OF CARE
Problem: Adult Inpatient Plan of Care  Goal: Plan of Care Review  Outcome: Ongoing, Progressing  Flowsheets (Taken 8/24/2022 2316)  Plan of Care Reviewed With: patient  Goal: Patient-Specific Goal (Individualized)  Outcome: Ongoing, Progressing  Goal: Absence of Hospital-Acquired Illness or Injury  Outcome: Ongoing, Progressing  Intervention: Identify and Manage Fall Risk  Flowsheets (Taken 8/24/2022 2316)  Safety Promotion/Fall Prevention:   assistive device/personal item within reach   side rails raised x 2   nonskid shoes/socks when out of bed   Fall Risk signage in place  Intervention: Prevent Skin Injury  Flowsheets (Taken 8/24/2022 2316)  Body Position: position changed independently  Intervention: Prevent and Manage VTE (Venous Thromboembolism) Risk  Flowsheets (Taken 8/24/2022 2316)  VTE Prevention/Management:   remove, assess skin, and reapply sequential compression device   bleeding risk assessed  Intervention: Prevent Infection  Flowsheets (Taken 8/24/2022 2316)  Infection Prevention:   hand hygiene promoted   single patient room provided   rest/sleep promoted  Goal: Optimal Comfort and Wellbeing  Outcome: Ongoing, Progressing  Intervention: Monitor Pain and Promote Comfort  Flowsheets (Taken 8/24/2022 2316)  Pain Management Interventions: care clustered  Intervention: Provide Person-Centered Care  Flowsheets (Taken 8/24/2022 2316)  Trust Relationship/Rapport:   care explained   emotional support provided  Goal: Readiness for Transition of Care  Outcome: Ongoing, Progressing     Problem: Skin Injury Risk Increased  Goal: Skin Health and Integrity  Outcome: Ongoing, Progressing

## 2022-08-25 NOTE — PT/OT/SLP EVAL
"Occupational Therapy   Evaluation    Name: Awa Ziegler  MRN: [id number]  Admitting Diagnosis:  Dizziness hx: vertigo   Recent Surgery: * No surgery found *      Recommendations:     Discharge Recommendations: rehabilitation facility  Discharge Equipment Recommendations:  hip kit  Barriers to discharge:  Other (Comment) (Severity of deficits; Fall risk)    Assessment:     Awa Ziegler is a 72 y.o. female with a medical diagnosis of Dizziness. Performance deficits affecting function: weakness, impaired functional mobility, impaired endurance, impaired balance, impaired self care skills, impaired sensation.      Rehab Prognosis: Good; patient would benefit from acute skilled OT services to address these deficits and reach maximum level of function.       Plan:     Patient to be seen 5 x/week to address the above listed problems via self-care/home management, therapeutic activities, therapeutic exercises  · Plan of Care Expires: 09/08/22  · Plan of Care Reviewed with: patient    Subjective     Chief Complaint: "My left hand feels like it's sleeping"   Patient/Family Comments/goals: To get stronger     Occupational Profile:  Living Environment: Pt lives in a SLH c no steps to enter. Has a walk in shower c a shower chair   Previous level of function: IND c ADLs and mobility using a rollator; Reported increased diffiiculty c LB dressing. Stated she has a hx of L foot drop  Roles and Routines:    Equipment Used at Home:  rollator, walker, rolling, cane, straight, shower chair  Assistance upon Discharge: Pt lives alone and is at high risk for falls at d/c- would benefit from placement.     Pain/Comfort:  · Pain Rating 1: 0/10    Patients cultural, spiritual, Yazdanism conflicts given the current situation: no    Objective:     Communicated with: RN prior to session.  Patient found up in chair  upon OT entry to room.    General Precautions: Standard, fall   Orthopedic Precautions:N/A   Braces: " N/A  Respiratory Status: Room air  BP: 143/71  Occupational Performance:    Bed Mobility:    · Did not occur; Pt seated UI upon arrival     Functional Mobility/Transfers:  · Patient completed Sit <> Stand Transfer with minimum assistance  with  rolling walker   · Patient completed Toilet Transfer Step Transfer technique with minimum assistance with  rolling walker  · Functional Mobility: Pt ambulated to bathroom using RW c Min A; Reported the checkered tile floor in the bathroom makes her dizzy.     Activities of Daily Living:  · Grooming: contact guard assistance CGA for balance while pt washed hands at sink  · Lower Body Dressing: maximal assistance Doff/don socks   · Toileting: minimum assistance Min A for clothing management     Cognitive/Visual Perceptual:  Cognitive/Psychosocial Skills:     -       Oriented to: Person, Place, Time and Situation   -       Follows Commands/attention:Follows multistep  commands  -       Safety awareness/insight to disability: intact   -       Mood/Affect/Coping skills/emotional control: Appropriate to situation, Cooperative and Motivated    Physical Exam:  Upper Extremity Strength:    -       Right Upper Extremity: WFL  -       Left Upper Extremity: shoulder flexion: 3, elbow flex/ext: 3+, digit flexion 4-  Reports numbness in thenar and hypothenar eminence   Treatment & Education:  Educated pt on OT POC- pt verbalized understanding.   Education:    Patient left up in chair with call button in reach    GOALS:   Multidisciplinary Problems     Occupational Therapy Goals        Problem: Occupational Therapy    Goal Priority Disciplines Outcome Interventions   Occupational Therapy Goal     OT, PT/OT Ongoing, Progressing    Description: Goals to be met by: 9/8     Patient will increase functional independence with ADLs by performing:    UE Dressing with [place] including management of fasteners and buttons.  LE Dressing with Modified [place] using AE.  Grooming while  standing at sink with Modified [place].  Toileting from toilet with Modified [place] for hygiene and clothing management.   Toilet transfer to toilet with Modified [place].  Upper extremity exercise program 10 reps per handout, with independence.                     History:     Past Medical History:   Diagnosis Date    Coronary artery disease     Diabetes mellitus     Hypertension     Thyroid disease        Past Surgical History:   Procedure Laterality Date    APPENDECTOMY      CHOLECYSTECTOMY      HYSTERECTOMY      TONSILLECTOMY         Time Tracking:     OT Date of Treatment: 09/08/22  OT Start Time: 0944  OT Stop Time: 1000  OT Total Time (min): 16 min    Billable Minutes:Evaluation Moderate complexity    8/25/2022

## 2022-08-25 NOTE — PLAN OF CARE
Sw sent new referral to [facility] Phone: [phone]/Fax:[phone] via [employer].

## 2022-08-25 NOTE — PLAN OF CARE
Problem: Occupational Therapy  Goal: Occupational Therapy Goal  Description: Goals to be met by: 9/8     Patient will increase functional independence with ADLs by performing:    UE Dressing with [place] including management of fasteners and buttons.  LE Dressing with Modified [place] using AE.  Grooming while standing at sink with Modified [place].  Toileting from toilet with Modified [place] for hygiene and clothing management.   Toilet transfer to toilet with Modified [place].  Upper extremity exercise program 10 reps per handout, with independence.    Outcome: Ongoing, Progressing

## 2022-08-25 NOTE — PROGRESS NOTES
"[facility] Progress Note        Chief Complaint: Inpatient Follow-up for Dizziness    HPI:   Mr. Ziegler is a a 72-year-old white female with history of vertigo, hypertension, insulin-dependent type 2 diabetes mellitus, hypothyroidism who presents to the ED with complaints of dizziness with two ground level falls today.  Patient has a history of vertigo and was treated for this approximately 2 weeks ago.  She states " they fixed the rocks in my ears" with resolution of symptoms however symptoms returned a few days ago and she was seen this morning at [facility] [place] and was given  meclizine  with minimal improvement and discharged home.  When she went home her dizziness continued and she had 2 ground level falls with no head trauma or loss of consciousness and  so she returned to the ED for further evaluation.  She denies unilateral weakness, headache, vision changes, history of transient ischemic attack or stroke. She has a non-functioning spinal cord stimulator and is unable to get MRI.      Upon arrival into the ED patient remained hemodynamically stable and afebrile with an oxygen saturation of 96% on room air.  Labs were remarkable for an elevated serum creatinine, mild leukocytosis of 12,200 with a left shift, urinalysis with 2+ leuk esterase, 23 wbc's with no bacteria. She denies urinary symptoms and has chronic urinary incontinence. CT of the head was negative for acute intracranial findings.  She was given meclizine with improvement in her symptoms, however she was still unsteady on her feet and lives alone therefore Hospitalist Service asked to very promptly admitted the patient for observation. PTx was started and she was making a good clinical recovery.        Interval Hx:   Patient awake and comfortable. Had mild left arm numbness this morning but now resolved. Ambulating with PTx. Still feels weak and unable to get out of bed secondary to generalized weakness. "     Objective/physical exam:  General: In no acute distress, afebrile, obese   Chest: Clear to auscultation bilaterally  Heart: RRR, +S1, S2, no appreciable murmur  Abdomen: Soft, nontender, BS +  MSK: Warm, no lower extremity edema, no clubbing or cyanosis  Neurologic: Alert and oriented x4, Cranial nerve II-XII intact, Strength 5/5 in all 4 extremities    VITAL SIGNS: 24 HRS MIN & MAX LAST   Temp  Min: 97.4 °F (36.3 °C)  Max: 98.1 °F (36.7 °C) 97.4 °F (36.3 °C)   BP  Min: 127/74  Max: 158/87 (!) 144/74   Pulse  Min: 65  Max: 78  68   Resp  Min: 15  Max: 18 18   SpO2  Min: 94 %  Max: 96 % (!) 94 %       Recent Labs   Lab 08/21/22  1433   WBC 12.2*   RBC 4.75   HGB 14.7   HCT 45.5   MCV 95.8*   MCH 30.9   MCHC 32.3*   RDW 13.0      MPV 9.8       Recent Labs   Lab 08/21/22  1433 08/24/22  0406    139   K 5.0 4.3   CO2 25 20*   BUN 21.1* 20.8*   CREATININE 1.27* 0.82   CALCIUM 9.7 9.2   ALBUMIN 3.5  --    ALKPHOS 131  --    ALT 13  --    AST 9  --    BILITOT 0.3  --           Microbiology Results (last 7 days)     Procedure Component Value Units Date/Time    Urine culture [[id number]] Collected: 08/21/22 4038    Order Status: Completed Specimen: Urine Updated: 08/23/22 0941     Urine Culture No Significant Growth           See below for Radiology    Scheduled Med:   amLODIPine  5 mg Oral Daily    aspirin  81 mg Oral Daily    buPROPion  150 mg Oral Daily    clopidogreL  75 mg Oral Daily    doxepin  20 mg Oral QHS    glimepiride  4 mg Oral Daily with breakfast    insulin detemir U-100  15 Units Subcutaneous QHS    isosorbide mononitrate  30 mg Oral Daily    levothyroxine  175 mcg Oral Daily    metoprolol succinate  25 mg Oral Daily    pantoprazole  40 mg Oral Daily    polyethylene glycol  17 g Oral Daily    ranolazine  1,000 mg Oral BID        Continuous Infusions:       PRN Meds:  albuterol, dextrose 10%, dextrose 10%, glucagon (human recombinant), glucose, glucose, HYDROcodone-acetaminophen,  insulin aspart U-100, meclizine, pregabalin       Assessment/Plan:  Vertigo   Anxiety disorder  Essential hypertension  Insulin-dependent type 2 diabetes mellitus   Hypothyroidism    Plan:  Patient has persistent generalized weakness  Cont OT/PTx   Cont PTx   Blood sugars stable   HbA1c 8, will adjust levemir to keep blood sugars <180    Continue strict aspiration, fall and decubitus precautions     CM working on rehab vs SNF per family request     VTE prophylaxis: SCDs    Patient condition:  Fair    Anticipated discharge and Disposition:         All diagnosis and differential diagnosis have been reviewed; assessment and plan has been documented; I have personally reviewed the labs and test results that are presently available; I have reviewed the patients medication list; I have reviewed the consulting providers response and recommendations. I have reviewed or attempted to review medical records based upon their availability    All of the patient's questions have been  addressed and answered. Patient's is agreeable to the above stated plan. I will continue to monitor closely and make adjustments to medical management as needed.  _____________________________________________________________________    Nutrition Status:    Radiology:  CT Head Without Contrast  Narrative: EXAMINATION:  CT HEAD WITHOUT CONTRAST    CLINICAL HISTORY:  Dizziness, persistent/recurrent, cardiac or vascular cause suspected;    TECHNIQUE:  Sequential axial images were performed of the brain without contrast.    Dose product length of 1229 mGycm. Automated exposure control was utilized to minimize radiation dose.    COMPARISON:  None available.    FINDINGS:  There is no intracranial mass effect, midline shift, hydrocephalus or hemorrhage. There is no sulcal effacement or low attenuation changes to suggest recent large vessel territory infarction. Chronic appearing periventricular and subcortical white matter low attenuation changes are present  and are consistent with chronic microangiopathic ischemia. The ventricular system and sulcal markings prominence is consistent with atrophy. There is no acute extra axial fluid collection. Visualized paranasal sinuses are clear without mucosal thickening, polypoidal abnormality or air-fluid levels. Mastoid air cells aeration is optimal.  Impression: 1.  No acute intracranial findings identified.    2.  Chronic microangiopathic ischemia and atrophy.    Electronically signed by: Elvis Kang  Date:    08/21/2022  Time:    14:40      Iggy Resendiz MD   08/25/2022

## 2022-08-25 NOTE — PLAN OF CARE
BOOGIE received a call from Fanny with [facility] and she noted she will submit for insurance authorization. She also noted patient's current dx and observation status may hinder her ability to be placed. Boogie verbalized understanding.    not applicable

## 2022-08-26 LAB
POCT GLUCOSE: 204 MG/DL (ref 70–110)
POCT GLUCOSE: 207 MG/DL (ref 70–110)
POCT GLUCOSE: 244 MG/DL (ref 70–110)

## 2022-08-26 PROCEDURE — 97116 GAIT TRAINING THERAPY: CPT

## 2022-08-26 PROCEDURE — 96372 THER/PROPH/DIAG INJ SC/IM: CPT | Performed by: NURSE PRACTITIONER

## 2022-08-26 PROCEDURE — 97535 SELF CARE MNGMENT TRAINING: CPT | Mod: CO

## 2022-08-26 PROCEDURE — 63600175 PHARM REV CODE 636 W HCPCS: Performed by: NURSE PRACTITIONER

## 2022-08-26 PROCEDURE — 25000003 PHARM REV CODE 250: Performed by: NURSE PRACTITIONER

## 2022-08-26 PROCEDURE — 96372 THER/PROPH/DIAG INJ SC/IM: CPT | Performed by: INTERNAL MEDICINE

## 2022-08-26 PROCEDURE — G0378 HOSPITAL OBSERVATION PER HR: HCPCS

## 2022-08-26 PROCEDURE — 63600175 PHARM REV CODE 636 W HCPCS: Performed by: INTERNAL MEDICINE

## 2022-08-26 PROCEDURE — 25000003 PHARM REV CODE 250: Performed by: INTERNAL MEDICINE

## 2022-08-26 RX ORDER — LISINOPRIL 10 MG/1
10 TABLET ORAL DAILY
Status: DISCONTINUED | OUTPATIENT
Start: 2022-08-27 | End: 2022-09-01 | Stop reason: HOSPADM

## 2022-08-26 RX ADMIN — HYDROCODONE BITARTRATE AND ACETAMINOPHEN 1 TABLET: 10; 325 TABLET ORAL at 09:08

## 2022-08-26 RX ADMIN — HYDROCODONE BITARTRATE AND ACETAMINOPHEN 1 TABLET: 10; 325 TABLET ORAL at 08:08

## 2022-08-26 RX ADMIN — RANOLAZINE 1000 MG: 500 TABLET, EXTENDED RELEASE ORAL at 08:08

## 2022-08-26 RX ADMIN — BUPROPION HYDROCHLORIDE 150 MG: 150 TABLET, FILM COATED, EXTENDED RELEASE ORAL at 05:08

## 2022-08-26 RX ADMIN — GLIMEPIRIDE 4 MG: 4 TABLET ORAL at 05:08

## 2022-08-26 RX ADMIN — POLYETHYLENE GLYCOL 3350 17 G: 17 POWDER, FOR SOLUTION ORAL at 08:08

## 2022-08-26 RX ADMIN — INSULIN DETEMIR 25 UNITS: 100 INJECTION, SOLUTION SUBCUTANEOUS at 09:08

## 2022-08-26 RX ADMIN — LEVOTHYROXINE SODIUM 175 MCG: 150 TABLET ORAL at 05:08

## 2022-08-26 RX ADMIN — PANTOPRAZOLE SODIUM 40 MG: 40 TABLET, DELAYED RELEASE ORAL at 08:08

## 2022-08-26 RX ADMIN — INSULIN ASPART 4 UNITS: 100 INJECTION, SOLUTION INTRAVENOUS; SUBCUTANEOUS at 12:08

## 2022-08-26 RX ADMIN — MECLIZINE HYDROCHLORIDE 25 MG: 25 TABLET ORAL at 09:08

## 2022-08-26 RX ADMIN — ASPIRIN 81 MG: 81 TABLET, COATED ORAL at 08:08

## 2022-08-26 RX ADMIN — RANOLAZINE 1000 MG: 500 TABLET, EXTENDED RELEASE ORAL at 09:08

## 2022-08-26 RX ADMIN — INSULIN ASPART 4 UNITS: 100 INJECTION, SOLUTION INTRAVENOUS; SUBCUTANEOUS at 06:08

## 2022-08-26 RX ADMIN — CLOPIDOGREL 75 MG: 75 TABLET, FILM COATED ORAL at 05:08

## 2022-08-26 RX ADMIN — INSULIN ASPART 4 UNITS: 100 INJECTION, SOLUTION INTRAVENOUS; SUBCUTANEOUS at 05:08

## 2022-08-26 RX ADMIN — AMLODIPINE BESYLATE 5 MG: 5 TABLET ORAL at 05:08

## 2022-08-26 RX ADMIN — PREGABALIN 100 MG: 50 CAPSULE ORAL at 09:08

## 2022-08-26 NOTE — PLAN OF CARE
"DANIELLA was informed by Fanny with [facility] that a note or order from the doctor stating "patient can tolerate 3 hours of intense therapy and recommends inpatient rehabilation". DANIELLA will talk with the doctor to see if he agrees with the requested information.   "

## 2022-08-26 NOTE — PROGRESS NOTES
"Inpatient Nutrition Evaluation    Admit Date: 8/21/2022   Nutrition Recommendation/Prescription     1. Continue diabetic diet as tolerated  2. RD to monitor po intake and weight changes    Nutrition Assessment     Chart Review    Reason Seen: length of stay    Diagnosis:Vertigo, weakness      Relevant Medical History: HTN, DM 2, hypothyroidism     Nutrition-Related Medications: insulin, Protonix daily, Miralax daily    Nutrition-Related Labs: 8/26: POCT [id number]      Diet Order: Diet diabetic  Oral Supplement Order: none at this time  Appetite/Oral Intake: good/% of meals  Factors Affecting Nutritional Intake: constipation  Food/Buddhism/Cultural Preferences: none reported       Wound(s):   n/a    Comments    8/26: pt reports good appetite this AM and yesterday, improving from 50% prior. Pt with constipation, meds noted. UBW reported 220 lb with no recent weight loss. Previous weight of 108.7 kg (239 lb) on 5/31. Possible weight error or possible 8% weight loss in 3 months. Will continue to monitor.     Anthropometrics    Height: 5' 3" (160 cm)    Last Weight: 99.8 kg (220 lb) (08/22/22 2154) Weight Method: Standard Scale  BMI (Calculated): 39  BMI Classification: obese grade II (BMI 35-39.9)  Ideal Body Weight (IBW), Female: 115 lb  % Ideal Body Weight, Female (lb): 191.3 %                         Wt Readings from Last 5 Encounters:   08/22/22 99.8 kg (220 lb)   05/31/22 108.7 kg (239 lb 10.2 oz)     Weight Change(s) Since Admission:  Admit Weight: 99.8 kg (220 lb) (08/21/22 1351)    Patient Education    Not applicable.    Monitoring & Evaluation     Dietitian will monitor food and beverage intake and weight change.  Nutrition Risk/Follow-Up: low (follow-up in 5-7 days)  Patients assigned 'low nutrition risk' status do not qualify for a full nutritional assessment but will be monitored and re-evaluated in a 5-7 day time period.    Jeni Boucher, Registration Eligible, Provisional LDN    "

## 2022-08-26 NOTE — PLAN OF CARE
Problem: Adult Inpatient Plan of Care  Goal: Plan of Care Review  8/26/2022 1525 by Lory Willis RN  Outcome: Ongoing, Progressing  8/26/2022 1525 by Lory Willis RN  Outcome: Ongoing, Progressing  Goal: Patient-Specific Goal (Individualized)  8/26/2022 1525 by Lory Willis RN  Outcome: Ongoing, Progressing  8/26/2022 1525 by Lory Willis RN  Outcome: Ongoing, Progressing  Goal: Absence of Hospital-Acquired Illness or Injury  8/26/2022 1525 by Lory Willis RN  Outcome: Ongoing, Progressing  8/26/2022 1525 by Lory Willis RN  Outcome: Ongoing, Progressing  Goal: Optimal Comfort and Wellbeing  8/26/2022 1525 by Lory Willis RN  Outcome: Ongoing, Progressing  8/26/2022 1525 by Lory Willis RN  Outcome: Ongoing, Progressing  Goal: Readiness for Transition of Care  8/26/2022 1525 by Lory Willis RN  Outcome: Ongoing, Progressing  8/26/2022 1525 by Lory Willis RN  Outcome: Ongoing, Progressing     Problem: Skin Injury Risk Increased  Goal: Skin Health and Integrity  8/26/2022 1525 by Lory Willis RN  Outcome: Ongoing, Progressing  8/26/2022 1525 by Lory Willis RN  Outcome: Ongoing, Progressing

## 2022-08-26 NOTE — PROGRESS NOTES
"[facility] Medicine Progress Note        Chief Complaint: Inpatient Follow-up for Dizziness    HPI:   Mr. Ziegler is a a 72-year-old white female with history of vertigo, hypertension, insulin-dependent type 2 diabetes mellitus, hypothyroidism who presents to the ED with complaints of dizziness with two ground level falls today.  Patient has a history of vertigo and was treated for this approximately 2 weeks ago.  She states " they fixed the rocks in my ears" with resolution of symptoms however symptoms returned a few days ago and she was seen this morning at [facility] [place] and was given  meclizine  with minimal improvement and discharged home.  When she went home her dizziness continued and she had 2 ground level falls with no head trauma or loss of consciousness and  so she returned to the ED for further evaluation.  She denies unilateral weakness, headache, vision changes, history of transient ischemic attack or stroke. She has a non-functioning spinal cord stimulator and is unable to get MRI.      Upon arrival into the ED patient remained hemodynamically stable and afebrile with an oxygen saturation of 96% on room air.  Labs were remarkable for an elevated serum creatinine, mild leukocytosis of 12,200 with a left shift, urinalysis with 2+ leuk esterase, 23 wbc's with no bacteria. She denies urinary symptoms and has chronic urinary incontinence. CT of the head was negative for acute intracranial findings.  She was given meclizine with improvement in her symptoms, however she was still unsteady on her feet and lives alone therefore Hospitalist Service asked to very promptly admitted the patient for observation. PTx was started and she was making a good clinical recovery.        Interval Hx:   Patient awake and comfortable. No acute issues overnight. Per todays PTx note patient can tolerate 3 hrs of therapy in the rehab,       Objective/physical exam:  General: In no acute distress, " afebrile, obese   Chest: Clear to auscultation bilaterally  Heart: RRR, +S1, S2, no appreciable murmur  Abdomen: Soft, nontender, BS +  MSK: Warm, no lower extremity edema, no clubbing or cyanosis  Neurologic: Alert and oriented x4, Cranial nerve II-XII intact, Strength 5/5 in all 4 extremities    VITAL SIGNS: 24 HRS MIN & MAX LAST   Temp  Min: 97.6 °F (36.4 °C)  Max: 98.4 °F (36.9 °C) 98.4 °F (36.9 °C)   BP  Min: 115/75  Max: 156/76 (!) 153/69   Pulse  Min: 62  Max: 73  72   Resp  Min: 14  Max: 20 20   SpO2  Min: 93 %  Max: 98 % 98 %       Recent Labs   Lab 08/21/22  1433   WBC 12.2*   RBC 4.75   HGB 14.7   HCT 45.5   MCV 95.8*   MCH 30.9   MCHC 32.3*   RDW 13.0      MPV 9.8       Recent Labs   Lab 08/21/22  1433 08/24/22  0406    139   K 5.0 4.3   CO2 25 20*   BUN 21.1* 20.8*   CREATININE 1.27* 0.82   CALCIUM 9.7 9.2   ALBUMIN 3.5  --    ALKPHOS 131  --    ALT 13  --    AST 9  --    BILITOT 0.3  --           Microbiology Results (last 7 days)     Procedure Component Value Units Date/Time    Urine culture [[id number]] Collected: 08/21/22 1657    Order Status: Completed Specimen: Urine Updated: 08/23/22 0941     Urine Culture No Significant Growth           See below for Radiology    Scheduled Med:   amLODIPine  5 mg Oral Daily    aspirin  81 mg Oral Daily    buPROPion  150 mg Oral Daily    clopidogreL  75 mg Oral Daily    doxepin  20 mg Oral QHS    glimepiride  4 mg Oral Daily with breakfast    insulin detemir U-100  20 Units Subcutaneous QHS    levothyroxine  175 mcg Oral Daily    [START ON 8/27/2022] lisinopriL  10 mg Oral Daily    pantoprazole  40 mg Oral Daily    polyethylene glycol  17 g Oral Daily    ranolazine  1,000 mg Oral BID        Continuous Infusions:       PRN Meds:  albuterol, dextrose 10%, dextrose 10%, glucagon (human recombinant), glucose, glucose, HYDROcodone-acetaminophen, insulin aspart U-100, meclizine, pregabalin       Assessment/Plan:  Vertigo : resolved   Anxiety  disorder  Essential hypertension  Insulin-dependent type 2 diabetes mellitus   Hypothyroidism    Plan:  Patient doing good. Weakness has improved and she is participating with PTx.  Cont OT/PTx     HbA1c 8, will adjust levemir to keep blood sugars <180    Continue strict aspiration, fall and decubitus precautions     CM working on rehab vs SNF per family request     VTE prophylaxis: SCDs    Patient condition:  Fair    Anticipated discharge and Disposition:         All diagnosis and differential diagnosis have been reviewed; assessment and plan has been documented; I have personally reviewed the labs and test results that are presently available; I have reviewed the patients medication list; I have reviewed the consulting providers response and recommendations. I have reviewed or attempted to review medical records based upon their availability    All of the patient's questions have been  addressed and answered. Patient's is agreeable to the above stated plan. I will continue to monitor closely and make adjustments to medical management as needed.  _____________________________________________________________________    Nutrition Status:    Radiology:  CT Head Without Contrast  Narrative: EXAMINATION:  CT HEAD WITHOUT CONTRAST    CLINICAL HISTORY:  Dizziness, persistent/recurrent, cardiac or vascular cause suspected;    TECHNIQUE:  Sequential axial images were performed of the brain without contrast.    Dose product length of 1229 mGycm. Automated exposure control was utilized to minimize radiation dose.    COMPARISON:  None available.    FINDINGS:  There is no intracranial mass effect, midline shift, hydrocephalus or hemorrhage. There is no sulcal effacement or low attenuation changes to suggest recent large vessel territory infarction. Chronic appearing periventricular and subcortical white matter low attenuation changes are present and are consistent with chronic microangiopathic ischemia. The ventricular system  and sulcal markings prominence is consistent with atrophy. There is no acute extra axial fluid collection. Visualized paranasal sinuses are clear without mucosal thickening, polypoidal abnormality or air-fluid levels. Mastoid air cells aeration is optimal.  Impression: 1.  No acute intracranial findings identified.    2.  Chronic microangiopathic ischemia and atrophy.    Electronically signed by: Elvis Kang  Date:    08/21/2022  Time:    14:40      Iggy Resendiz MD   08/26/2022

## 2022-08-26 NOTE — PT/OT/SLP PROGRESS
Occupational Therapy  Treatment    Awa Ziegler   MRN: [id number]   Admitting Diagnosis: Dizziness    OT Date of Treatment: 08/26/22   OT Start Time: 1028  OT Stop Time: 1051  OT Total Time (min): 23 min     Billable Minutes:  Self Care/Home Management 2  Total Minutes: 23           SHARON Visit Number: 1    General Precautions: Standard, fall  Orthopedic Precautions:    Braces:      Spiritual, Cultural Beliefs, Anglican Practices, Values that Affect Care: no    Subjective:  Communicated with RN prior to session.  Alert  Motivated    Objective:  Pt. UIC upon entry.  Pt. Educated on hip kit for facilitation during LB dressing task.  Pt. Donning/doffing socks with minimal assistance required for setup using reacher to remove socks and sock aide to judith.  Appropriate sequencing of steps noted during dressing tasks.    Patient left up in chair with all lines intact and call button in reach    ASSESSMENT:  Awa Ziegler is a 72 y.o. female with a medical diagnosis of Dizziness.   * Pt. Can tolerate 3hrs of aggressive therapy. Continue POC  Rehab potential is good    Activity tolerance: Good    Discharge recommendations: rehabilitation facility     Equipment recommendations: hip kit     GOALS:   Multidisciplinary Problems     Occupational Therapy Goals        Problem: Occupational Therapy    Goal Priority Disciplines Outcome Interventions   Occupational Therapy Goal     OT, PT/OT Ongoing, Progressing    Description: Goals to be met by: 9/8     Patient will increase functional independence with ADLs by performing:    UE Dressing with Ce[place] including management of fasteners and buttons.  LE Dressing with Modified [place] using AE.  Grooming while standing at sink with Modified [place].  Toileting from toilet with Modified [place] for hygiene and clothing management.   Toilet transfer to toilet with Modified [place].  Upper extremity exercise program 10 reps per handout, with independence.                      Plan:  Patient to be seen 5 x/week to address the above listed problems via self-care/home management, therapeutic activities, therapeutic exercises  Plan of Care expires: 09/08/22  Plan of Care reviewed with: patient         08/26/2022

## 2022-08-26 NOTE — PT/OT/SLP PROGRESS
Physical Therapy  Treatment    wAa Ziegler   MRN: [id number]   Admitting Diagnosis: Dizziness    PT Received On: 08/26/22  PT Start Time: 1030     PT Stop Time: 1045    PT Total Time (min): 15 min       Billable Minutes:  Gait Training 15    Treatment Type: Treatment  PT/PTA: PT     PTA Visit Number: 4       General Precautions: Standard,       Respiratory Status: Room air    Spiritual, Cultural Beliefs, Pentecostal Practices, Values that Affect Care: no    Functional Mobility:  Pt sitting in chair upon arrival to room. In good spirits. Pt ambulated 100' w/ RW, min A for safety. L foot ER while ambulating. Decreased heel strike on L when fatigued. Pt able to self correct. Pt returned to sitting in chair w/ OT staff for OT session.    AM-PAC 6 CLICK MOBILITY  How much help from another person does this patient currently need?   1 = Unable, Total/Dependent Assistance  2 = A lot, Maximum/Moderate Assistance  3 = A little, Minimum/Contact Guard/Supervision  4 = None, Modified Bailey/Independent         AM-PAC Raw Score CMS G-Code Modifier Level of Impairment Assistance   6 % Total / Unable   7 - 9 CM 80 - 100% Maximal Assist   10 - 14 CL 60 - 80% Moderate Assist   15 - 19 CK 40 - 60% Moderate Assist   20 - 22 CJ 20 - 40% Minimal Assist   23 CI 1-20% SBA / CGA   24 CH 0% Independent/ Mod I       Assessment:  Awa Ziegler is a 72 y.o. female continues to show improvement in therapy sessions but does remain a high fall risk. **Pt can tolerated 3 hours of aggressive therapy per day** and would be a great inpatient rehab candidate.    Rehab identified problem list/impairments: Rehab identified problem list/impairments: weakness, impaired endurance, impaired functional mobility, gait instability, impaired balance    Rehab potential is excellent.    Activity tolerance: Good    Discharge recommendations: Discharge Facility/Level of Care Needs: rehabilitation facility   **Pt can tolerated 3 hours of  aggressive therapy per day**      GOALS:   Multidisciplinary Problems     Physical Therapy Goals        Problem: Physical Therapy    Goal Priority Disciplines Outcome Goal Variances Interventions   Physical Therapy Goal     PT, PT/OT Ongoing, Progressing     Description: Goals to be met by: 22     Patient will increase functional independence with mobility by performin. Supine to sit with Stand-by Assistance  2. Sit to supine with Stand-by Assistance  3. Sit to stand transfer with Stand-by Assistance  4. Gait  x 100 feet with Stand-by Assistance using Rolling Walker.                      PLAN:    Patient to be seen daily  to address the above listed problems via gait training, therapeutic activities, therapeutic exercises  Plan of Care expires: 22  Plan of Care reviewed with: patient         2022

## 2022-08-26 NOTE — PLAN OF CARE
SW sent referral to [facility] Phone: [phone] via [place].

## 2022-08-26 NOTE — PLAN OF CARE
Problem: Adult Inpatient Plan of Care  Goal: Plan of Care Review  Outcome: Ongoing, Progressing  Flowsheets (Taken 8/26/2022 0135)  Plan of Care Reviewed With: patient  Goal: Patient-Specific Goal (Individualized)  Outcome: Ongoing, Progressing  Goal: Absence of Hospital-Acquired Illness or Injury  Outcome: Ongoing, Progressing  Intervention: Identify and Manage Fall Risk  Flowsheets (Taken 8/26/2022 0135)  Safety Promotion/Fall Prevention:   assistive device/personal item within reach   side rails raised x 2   nonskid shoes/socks when out of bed   Fall Risk signage in place  Intervention: Prevent Skin Injury  Flowsheets (Taken 8/26/2022 0135)  Body Position: position changed independently  Intervention: Prevent and Manage VTE (Venous Thromboembolism) Risk  Flowsheets (Taken 8/26/2022 0135)  VTE Prevention/Management: bleeding risk assessed  Intervention: Prevent Infection  Flowsheets (Taken 8/26/2022 0135)  Infection Prevention:   hand hygiene promoted   rest/sleep promoted   single patient room provided  Goal: Optimal Comfort and Wellbeing  Outcome: Ongoing, Progressing  Intervention: Monitor Pain and Promote Comfort  Flowsheets (Taken 8/26/2022 0135)  Pain Management Interventions:   quiet environment facilitated   pillow support provided   medication offered   care clustered  Intervention: Provide Person-Centered Care  Flowsheets (Taken 8/26/2022 0135)  Trust Relationship/Rapport: care explained  Goal: Readiness for Transition of Care  Outcome: Ongoing, Progressing     Problem: Skin Injury Risk Increased  Goal: Skin Health and Integrity  Outcome: Ongoing, Progressing  Intervention: Promote and Optimize Oral Intake  Flowsheets (Taken 8/26/2022 0135)  Oral Nutrition Promotion: rest periods promoted

## 2022-08-27 LAB
POCT GLUCOSE: 227 MG/DL (ref 70–110)
POCT GLUCOSE: 246 MG/DL (ref 70–110)
POCT GLUCOSE: 250 MG/DL (ref 70–110)
POCT GLUCOSE: 250 MG/DL (ref 70–110)

## 2022-08-27 PROCEDURE — 96372 THER/PROPH/DIAG INJ SC/IM: CPT | Performed by: NURSE PRACTITIONER

## 2022-08-27 PROCEDURE — 63600175 PHARM REV CODE 636 W HCPCS: Performed by: INTERNAL MEDICINE

## 2022-08-27 PROCEDURE — G0378 HOSPITAL OBSERVATION PER HR: HCPCS

## 2022-08-27 PROCEDURE — 63600175 PHARM REV CODE 636 W HCPCS: Performed by: NURSE PRACTITIONER

## 2022-08-27 PROCEDURE — 96372 THER/PROPH/DIAG INJ SC/IM: CPT | Performed by: INTERNAL MEDICINE

## 2022-08-27 PROCEDURE — 25000003 PHARM REV CODE 250: Performed by: NURSE PRACTITIONER

## 2022-08-27 PROCEDURE — 25000003 PHARM REV CODE 250: Performed by: INTERNAL MEDICINE

## 2022-08-27 RX ADMIN — RANOLAZINE 1000 MG: 500 TABLET, EXTENDED RELEASE ORAL at 09:08

## 2022-08-27 RX ADMIN — PANTOPRAZOLE SODIUM 40 MG: 40 TABLET, DELAYED RELEASE ORAL at 09:08

## 2022-08-27 RX ADMIN — GLIMEPIRIDE 4 MG: 4 TABLET ORAL at 06:08

## 2022-08-27 RX ADMIN — INSULIN ASPART 4 UNITS: 100 INJECTION, SOLUTION INTRAVENOUS; SUBCUTANEOUS at 05:08

## 2022-08-27 RX ADMIN — CLOPIDOGREL 75 MG: 75 TABLET, FILM COATED ORAL at 06:08

## 2022-08-27 RX ADMIN — MECLIZINE HYDROCHLORIDE 25 MG: 25 TABLET ORAL at 09:08

## 2022-08-27 RX ADMIN — PREGABALIN 100 MG: 50 CAPSULE ORAL at 10:08

## 2022-08-27 RX ADMIN — HYDROCODONE BITARTRATE AND ACETAMINOPHEN 1 TABLET: 10; 325 TABLET ORAL at 09:08

## 2022-08-27 RX ADMIN — INSULIN ASPART 4 UNITS: 100 INJECTION, SOLUTION INTRAVENOUS; SUBCUTANEOUS at 11:08

## 2022-08-27 RX ADMIN — ASPIRIN 81 MG: 81 TABLET, COATED ORAL at 09:08

## 2022-08-27 RX ADMIN — INSULIN ASPART 2 UNITS: 100 INJECTION, SOLUTION INTRAVENOUS; SUBCUTANEOUS at 10:08

## 2022-08-27 RX ADMIN — INSULIN DETEMIR 30 UNITS: 100 INJECTION, SOLUTION SUBCUTANEOUS at 09:08

## 2022-08-27 RX ADMIN — INSULIN ASPART 4 UNITS: 100 INJECTION, SOLUTION INTRAVENOUS; SUBCUTANEOUS at 06:08

## 2022-08-27 RX ADMIN — BUPROPION HYDROCHLORIDE 150 MG: 150 TABLET, FILM COATED, EXTENDED RELEASE ORAL at 06:08

## 2022-08-27 RX ADMIN — HYDROCODONE BITARTRATE AND ACETAMINOPHEN 1 TABLET: 10; 325 TABLET ORAL at 11:08

## 2022-08-27 RX ADMIN — AMLODIPINE BESYLATE 5 MG: 5 TABLET ORAL at 06:08

## 2022-08-27 RX ADMIN — LEVOTHYROXINE SODIUM 175 MCG: 150 TABLET ORAL at 06:08

## 2022-08-27 NOTE — PLAN OF CARE
Problem: Adult Inpatient Plan of Care  Goal: Plan of Care Review  Outcome: Ongoing, Progressing  Flowsheets (Taken 8/27/2022 0038)  Plan of Care Reviewed With: patient  Goal: Patient-Specific Goal (Individualized)  Outcome: Ongoing, Progressing  Goal: Absence of Hospital-Acquired Illness or Injury  Outcome: Ongoing, Progressing  Intervention: Identify and Manage Fall Risk  Flowsheets (Taken 8/27/2022 0038)  Safety Promotion/Fall Prevention:   assistive device/personal item within reach   nonskid shoes/socks when out of bed   side rails raised x 2  Intervention: Prevent Skin Injury  Flowsheets (Taken 8/27/2022 0038)  Body Position: position maintained  Skin Protection:   adhesive use limited   incontinence pads utilized  Intervention: Prevent and Manage VTE (Venous Thromboembolism) Risk  Flowsheets (Taken 8/27/2022 0038)  Activity Management: Ambulated to bathroom - L4  VTE Prevention/Management: ROM (passive) performed  Range of Motion:   active ROM (range of motion) encouraged   ROM (range of motion) performed  Intervention: Prevent Infection  Flowsheets (Taken 8/27/2022 0038)  Infection Prevention: hand hygiene promoted  Goal: Optimal Comfort and Wellbeing  Outcome: Ongoing, Progressing  Intervention: Monitor Pain and Promote Comfort  Flowsheets (Taken 8/27/2022 0038)  Pain Management Interventions:   care clustered   quiet environment facilitated  Intervention: Provide Person-Centered Care  Flowsheets (Taken 8/27/2022 0038)  Trust Relationship/Rapport: care explained  Goal: Readiness for Transition of Care  Outcome: Ongoing, Progressing     Problem: Skin Injury Risk Increased  Goal: Skin Health and Integrity  Outcome: Ongoing, Progressing  Intervention: Optimize Skin Protection  Flowsheets (Taken 8/27/2022 0038)  Pressure Reduction Techniques: frequent weight shift encouraged  Pressure Reduction Devices: positioning supports utilized  Skin Protection:   adhesive use limited   incontinence pads utilized  Head of  Bed (HOB) Positioning: HOB lowered  Intervention: Promote and Optimize Oral Intake  Flowsheets (Taken 8/27/2022 0038)  Oral Nutrition Promotion: rest periods promoted

## 2022-08-27 NOTE — PROGRESS NOTES
"[facility] Hospital Medicine Progress Note        Chief Complaint: Inpatient Follow-up for Dizziness    HPI:   Mr. Ziegler is a a 72-year-old white female with history of vertigo, hypertension, insulin-dependent type 2 diabetes mellitus, hypothyroidism who presents to the ED with complaints of dizziness with two ground level falls today.  Patient has a history of vertigo and was treated for this approximately 2 weeks ago.  She states " they fixed the rocks in my ears" with resolution of symptoms however symptoms returned a few days ago and she was seen this morning at [facility] and was given  meclizine  with minimal improvement and discharged home.  When she went home her dizziness continued and she had 2 ground level falls with no head trauma or loss of consciousness and  so she returned to the ED for further evaluation.  She denies unilateral weakness, headache, vision changes, history of transient ischemic attack or stroke. She has a non-functioning spinal cord stimulator and is unable to get MRI.      Upon arrival into the ED patient remained hemodynamically stable and afebrile with an oxygen saturation of 96% on room air.  Labs were remarkable for an elevated serum creatinine, mild leukocytosis of 12,200 with a left shift, urinalysis with 2+ leuk esterase, 23 wbc's with no bacteria. She denies urinary symptoms and has chronic urinary incontinence. CT of the head was negative for acute intracranial findings.  She was given meclizine with improvement in her symptoms, however she was still unsteady on her feet and lives alone therefore Hospitalist Service asked to very promptly admitted the patient for observation. PTx was started and she was making a good clinical recovery. Awaiting rehab placement.        Interval Hx:   Patient awake and comfortable. Ambulating well. Denies any dizziness or chest pain,     Objective/physical exam:  General: In no acute distress, afebrile, obese   Chest: " Clear to auscultation bilaterally  Heart: RRR, +S1, S2, no appreciable murmur  Abdomen: Soft, nontender, BS +  MSK: Warm, no lower extremity edema, no clubbing or cyanosis  Neurologic: Alert and oriented x4, Cranial nerve II-XII intact, Strength 5/5 in all 4 extremities    VITAL SIGNS: 24 HRS MIN & MAX LAST   Temp  Min: 97.6 °F (36.4 °C)  Max: 98.5 °F (36.9 °C) 97.8 °F (36.6 °C)   BP  Min: 124/75  Max: 147/79 124/75   Pulse  Min: 65  Max: 75  65   Resp  Min: 18  Max: 19 18   SpO2  Min: 97 %  Max: 99 % 98 %       Recent Labs   Lab 08/21/22  1433   WBC 12.2*   RBC 4.75   HGB 14.7   HCT 45.5   MCV 95.8*   MCH 30.9   MCHC 32.3*   RDW 13.0      MPV 9.8       Recent Labs   Lab 08/21/22  1433 08/24/22  0406    139   K 5.0 4.3   CO2 25 20*   BUN 21.1* 20.8*   CREATININE 1.27* 0.82   CALCIUM 9.7 9.2   ALBUMIN 3.5  --    ALKPHOS 131  --    ALT 13  --    AST 9  --    BILITOT 0.3  --           Microbiology Results (last 7 days)       Procedure Component Value Units Date/Time    Urine culture [[id number]] Collected: 08/21/22 1657    Order Status: Completed Specimen: Urine Updated: 08/23/22 0941     Urine Culture No Significant Growth             See below for Radiology    Scheduled Med:   amLODIPine  5 mg Oral Daily    aspirin  81 mg Oral Daily    buPROPion  150 mg Oral Daily    clopidogreL  75 mg Oral Daily    doxepin  20 mg Oral QHS    glimepiride  4 mg Oral Daily with breakfast    insulin detemir U-100  30 Units Subcutaneous QHS    levothyroxine  175 mcg Oral Daily    lisinopriL  10 mg Oral Daily    pantoprazole  40 mg Oral Daily    polyethylene glycol  17 g Oral Daily    ranolazine  1,000 mg Oral BID        Continuous Infusions:       PRN Meds:  albuterol, dextrose 10%, dextrose 10%, glucagon (human recombinant), glucose, glucose, HYDROcodone-acetaminophen, insulin aspart U-100, meclizine, pregabalin       Assessment/Plan:  Vertigo : resolved   Anxiety disorder  Essential hypertension  Insulin-dependent type 2  diabetes mellitus   Hypothyroidism    Plan:  Patient has no new issues. Ambulating to the rest room.   Cont OT/PTx     HbA1c 8, will adjust levemir to keep blood sugars <180    Continue strict aspiration, fall and decubitus precautions     CM working on rehab vs SNF per family request     VTE prophylaxis: SCDs    Patient condition:  Fair    Anticipated discharge and Disposition:         All diagnosis and differential diagnosis have been reviewed; assessment and plan has been documented; I have personally reviewed the labs and test results that are presently available; I have reviewed the patients medication list; I have reviewed the consulting providers response and recommendations. I have reviewed or attempted to review medical records based upon their availability    All of the patient's questions have been  addressed and answered. Patient's is agreeable to the above stated plan. I will continue to monitor closely and make adjustments to medical management as needed.  _____________________________________________________________________    Nutrition Status:    Radiology:  CT Head Without Contrast  Narrative: EXAMINATION:  CT HEAD WITHOUT CONTRAST    CLINICAL HISTORY:  Dizziness, persistent/recurrent, cardiac or vascular cause suspected;    TECHNIQUE:  Sequential axial images were performed of the brain without contrast.    Dose product length of 1229 mGycm. Automated exposure control was utilized to minimize radiation dose.    COMPARISON:  None available.    FINDINGS:  There is no intracranial mass effect, midline shift, hydrocephalus or hemorrhage. There is no sulcal effacement or low attenuation changes to suggest recent large vessel territory infarction. Chronic appearing periventricular and subcortical white matter low attenuation changes are present and are consistent with chronic microangiopathic ischemia. The ventricular system and sulcal markings prominence is consistent with atrophy. There is no acute  extra axial fluid collection. Visualized paranasal sinuses are clear without mucosal thickening, polypoidal abnormality or air-fluid levels. Mastoid air cells aeration is optimal.  Impression: 1.  No acute intracranial findings identified.    2.  Chronic microangiopathic ischemia and atrophy.    Electronically signed by: Elvis Kang  Date:    08/21/2022  Time:    14:40      Iggy Resendiz MD   08/27/2022

## 2022-08-28 LAB
POCT GLUCOSE: 201 MG/DL (ref 70–110)
POCT GLUCOSE: 228 MG/DL (ref 70–110)
POCT GLUCOSE: 255 MG/DL (ref 70–110)
POCT GLUCOSE: 288 MG/DL (ref 70–110)
POCT GLUCOSE: 316 MG/DL (ref 70–110)

## 2022-08-28 PROCEDURE — 25000003 PHARM REV CODE 250: Performed by: INTERNAL MEDICINE

## 2022-08-28 PROCEDURE — 63600175 PHARM REV CODE 636 W HCPCS: Performed by: NURSE PRACTITIONER

## 2022-08-28 PROCEDURE — 25000003 PHARM REV CODE 250: Performed by: NURSE PRACTITIONER

## 2022-08-28 PROCEDURE — 96372 THER/PROPH/DIAG INJ SC/IM: CPT | Performed by: INTERNAL MEDICINE

## 2022-08-28 PROCEDURE — G0378 HOSPITAL OBSERVATION PER HR: HCPCS

## 2022-08-28 PROCEDURE — 97116 GAIT TRAINING THERAPY: CPT | Mod: CQ

## 2022-08-28 PROCEDURE — 96372 THER/PROPH/DIAG INJ SC/IM: CPT | Performed by: NURSE PRACTITIONER

## 2022-08-28 PROCEDURE — 63600175 PHARM REV CODE 636 W HCPCS: Performed by: INTERNAL MEDICINE

## 2022-08-28 RX ADMIN — GLIMEPIRIDE 4 MG: 4 TABLET ORAL at 05:08

## 2022-08-28 RX ADMIN — INSULIN ASPART 4 UNITS: 100 INJECTION, SOLUTION INTRAVENOUS; SUBCUTANEOUS at 06:08

## 2022-08-28 RX ADMIN — INSULIN ASPART 4 UNITS: 100 INJECTION, SOLUTION INTRAVENOUS; SUBCUTANEOUS at 11:08

## 2022-08-28 RX ADMIN — PREGABALIN 100 MG: 50 CAPSULE ORAL at 08:08

## 2022-08-28 RX ADMIN — AMLODIPINE BESYLATE 5 MG: 5 TABLET ORAL at 05:08

## 2022-08-28 RX ADMIN — MECLIZINE HYDROCHLORIDE 25 MG: 25 TABLET ORAL at 10:08

## 2022-08-28 RX ADMIN — HYDROCODONE BITARTRATE AND ACETAMINOPHEN 1 TABLET: 10; 325 TABLET ORAL at 04:08

## 2022-08-28 RX ADMIN — HYDROCODONE BITARTRATE AND ACETAMINOPHEN 1 TABLET: 10; 325 TABLET ORAL at 08:08

## 2022-08-28 RX ADMIN — RANOLAZINE 1000 MG: 500 TABLET, EXTENDED RELEASE ORAL at 08:08

## 2022-08-28 RX ADMIN — BUPROPION HYDROCHLORIDE 150 MG: 150 TABLET, FILM COATED, EXTENDED RELEASE ORAL at 05:08

## 2022-08-28 RX ADMIN — ASPIRIN 81 MG: 81 TABLET, COATED ORAL at 08:08

## 2022-08-28 RX ADMIN — CLOPIDOGREL 75 MG: 75 TABLET, FILM COATED ORAL at 05:08

## 2022-08-28 RX ADMIN — INSULIN ASPART 6 UNITS: 100 INJECTION, SOLUTION INTRAVENOUS; SUBCUTANEOUS at 04:08

## 2022-08-28 RX ADMIN — LISINOPRIL 10 MG: 10 TABLET ORAL at 08:08

## 2022-08-28 RX ADMIN — INSULIN ASPART 4 UNITS: 100 INJECTION, SOLUTION INTRAVENOUS; SUBCUTANEOUS at 08:08

## 2022-08-28 RX ADMIN — LEVOTHYROXINE SODIUM 175 MCG: 150 TABLET ORAL at 05:08

## 2022-08-28 RX ADMIN — INSULIN DETEMIR 35 UNITS: 100 INJECTION, SOLUTION SUBCUTANEOUS at 08:08

## 2022-08-28 RX ADMIN — PANTOPRAZOLE SODIUM 40 MG: 40 TABLET, DELAYED RELEASE ORAL at 08:08

## 2022-08-28 NOTE — PT/OT/SLP PROGRESS
Physical Therapy  Treatment    Awa Ziegler   MRN: [id number]   Admitting Diagnosis: Dizziness    PT Received On: 08/28/22  PT Start Time: 1312     PT Stop Time: 1330    PT Total Time (min): 18 min       Billable Minutes:  Gait Training 1 unit    Treatment Type: Treatment        PTA Visit Number: 5       General Precautions: Standard, fall  N/A  Respiratory Status: Room air    Spiritual, Cultural Beliefs, Scientologist Practices, Values that Affect Care: no    Functional Mobility:    Transfers: Sit<->stand: SBA to min assist with RW  Gait with RW: 150' CGA with steady shayna, cues to correct RW proximity and LLE foot clearance.       AM-PAC 6 CLICK MOBILITY  How much help from another person does this patient currently need?   1 = Unable, Total/Dependent Assistance  2 = A lot, Maximum/Moderate Assistance  3 = A little, Minimum/Contact Guard/Supervision  4 = None, Modified [place]/Independent         AM-PAC Raw Score CMS G-Code Modifier Level of Impairment Assistance   6 % Total / Unable   7 - 9 CM 80 - 100% Maximal Assist   10 - 14 CL 60 - 80% Moderate Assist   15 - 19 CK 40 - 60% Moderate Assist   20 - 22 CJ 20 - 40% Minimal Assist   23 CI 1-20% SBA / CGA   24 CH 0% Independent/ Mod I       Assessment:  Awa Ziegler is a 72 y.o. female. Pt sitting in chair upon arrival to room. In good spirits. Pt able to intermittently correct LLE ER and foot clearance however tends to leave LLE behind with fatigue or LLE is outside pole of RW. Pt remains at increased risk of falling.  **Pt can tolerated 3 hours of aggressive therapy per day** and would be a great inpatient rehab candidate.    Rehab identified problem list/impairments: Rehab identified problem list/impairments: weakness, gait instability, impaired endurance, impaired balance, decreased safety awareness    Rehab potential is excellent.    Activity tolerance: Good    Discharge recommendations: Discharge Facility/Level of Care Needs: rehabilitation  facility   **Pt can tolerated 3 hours of aggressive therapy per day**      GOALS:   Multidisciplinary Problems       Physical Therapy Goals          Problem: Physical Therapy    Goal Priority Disciplines Outcome Goal Variances Interventions   Physical Therapy Goal     PT, PT/OT Ongoing, Progressing     Description: Goals to be met by: 22     Patient will increase functional independence with mobility by performin. Supine to sit with Stand-by Assistance  2. Sit to supine with Stand-by Assistance  3. Sit to stand transfer with Stand-by Assistance  4. Gait  x 100 feet with Stand-by Assistance using Rolling Walker.                          PLAN:    Patient to be seen daily  to address the above listed problems via gait training, therapeutic exercises, therapeutic activities  Plan of Care expires: 22  Plan of Care reviewed with: patient         2022

## 2022-08-28 NOTE — PROGRESS NOTES
"[facility] Hospital Medicine Progress Note        Chief Complaint: Inpatient Follow-up for Dizziness    HPI:   Mr. Ziegler is a a 72-year-old white female with history of vertigo, hypertension, insulin-dependent type 2 diabetes mellitus, hypothyroidism who presents to the ED with complaints of dizziness with two ground level falls today.  Patient has a history of vertigo and was treated for this approximately 2 weeks ago.  She states " they fixed the rocks in my ears" with resolution of symptoms however symptoms returned a few days ago and she was seen this morning at [facility] and was given  meclizine  with minimal improvement and discharged home.  When she went home her dizziness continued and she had 2 ground level falls with no head trauma or loss of consciousness and  so she returned to the ED for further evaluation.  She denies unilateral weakness, headache, vision changes, history of transient ischemic attack or stroke. She has a non-functioning spinal cord stimulator and is unable to get MRI.      Upon arrival into the ED patient remained hemodynamically stable and afebrile with an oxygen saturation of 96% on room air.  Labs were remarkable for an elevated serum creatinine, mild leukocytosis of 12,200 with a left shift, urinalysis with 2+ leuk esterase, 23 wbc's with no bacteria. She denies urinary symptoms and has chronic urinary incontinence. CT of the head was negative for acute intracranial findings.  She was given meclizine with improvement in her symptoms, however she was still unsteady on her feet and lives alone therefore Hospitalist Service asked to very promptly admitted the patient for observation. PTx was started and she was making a good clinical recovery. Awaiting rehab placement.        Interval Hx:   Patient awake and comfortable. Has no acute issues.Denies any headache, dizziness or palpitation. Ambulating well with a walker.     Objective/physical exam:  General: In " no acute distress, afebrile, obese   Chest: Clear to auscultation bilaterally  Heart: RRR, +S1, S2, no appreciable murmur  Abdomen: Soft, nontender, BS +  MSK: Warm, no lower extremity edema, no clubbing or cyanosis  Neurologic: Alert and oriented x4, Cranial nerve II-XII intact, Strength 5/5 in all 4 extremities    VITAL SIGNS: 24 HRS MIN & MAX LAST   Temp  Min: 97.4 °F (36.3 °C)  Max: 98 °F (36.7 °C) 97.9 °F (36.6 °C)   BP  Min: 122/68  Max: 161/70 122/68   Pulse  Min: 65  Max: 71  68   Resp  Min: 18  Max: 19 18   SpO2  Min: 96 %  Max: 100 % 98 %       Recent Labs   Lab 08/21/22  1433   WBC 12.2*   RBC 4.75   HGB 14.7   HCT 45.5   MCV 95.8*   MCH 30.9   MCHC 32.3*   RDW 13.0      MPV 9.8       Recent Labs   Lab 08/21/22  1433 08/24/22  0406    139   K 5.0 4.3   CO2 25 20*   BUN 21.1* 20.8*   CREATININE 1.27* 0.82   CALCIUM 9.7 9.2   ALBUMIN 3.5  --    ALKPHOS 131  --    ALT 13  --    AST 9  --    BILITOT 0.3  --           Microbiology Results (last 7 days)       Procedure Component Value Units Date/Time    Urine culture [[id number]] Collected: 08/21/22 1657    Order Status: Completed Specimen: Urine Updated: 08/23/22 0941     Urine Culture No Significant Growth             See below for Radiology    Scheduled Med:   amLODIPine  5 mg Oral Daily    aspirin  81 mg Oral Daily    buPROPion  150 mg Oral Daily    clopidogreL  75 mg Oral Daily    doxepin  20 mg Oral QHS    glimepiride  4 mg Oral Daily with breakfast    insulin detemir U-100  35 Units Subcutaneous QHS    levothyroxine  175 mcg Oral Daily    lisinopriL  10 mg Oral Daily    pantoprazole  40 mg Oral Daily    polyethylene glycol  17 g Oral Daily    ranolazine  1,000 mg Oral BID        Continuous Infusions:       PRN Meds:  albuterol, dextrose 10%, dextrose 10%, glucagon (human recombinant), glucose, glucose, HYDROcodone-acetaminophen, insulin aspart U-100, meclizine, pregabalin       Assessment/Plan:  Vertigo : resolved   Anxiety  disorder  Essential hypertension  Insulin-dependent type 2 diabetes mellitus   Hypothyroidism    Plan:  Patient doing well. Ambulating well.   Cont OT/PTx     HbA1c 8, will adjust levemir to keep blood sugars <180    Continue strict aspiration, fall and decubitus precautions     CM working on rehab vs SNF per family request     VTE prophylaxis: SCDs    Patient condition:  Fair    Anticipated discharge and Disposition:         All diagnosis and differential diagnosis have been reviewed; assessment and plan has been documented; I have personally reviewed the labs and test results that are presently available; I have reviewed the patients medication list; I have reviewed the consulting providers response and recommendations. I have reviewed or attempted to review medical records based upon their availability    All of the patient's questions have been  addressed and answered. Patient's is agreeable to the above stated plan. I will continue to monitor closely and make adjustments to medical management as needed.  _____________________________________________________________________    Nutrition Status:    Radiology:  CT Head Without Contrast  Narrative: EXAMINATION:  CT HEAD WITHOUT CONTRAST    CLINICAL HISTORY:  Dizziness, persistent/recurrent, cardiac or vascular cause suspected;    TECHNIQUE:  Sequential axial images were performed of the brain without contrast.    Dose product length of 1229 mGycm. Automated exposure control was utilized to minimize radiation dose.    COMPARISON:  None available.    FINDINGS:  There is no intracranial mass effect, midline shift, hydrocephalus or hemorrhage. There is no sulcal effacement or low attenuation changes to suggest recent large vessel territory infarction. Chronic appearing periventricular and subcortical white matter low attenuation changes are present and are consistent with chronic microangiopathic ischemia. The ventricular system and sulcal markings prominence is  consistent with atrophy. There is no acute extra axial fluid collection. Visualized paranasal sinuses are clear without mucosal thickening, polypoidal abnormality or air-fluid levels. Mastoid air cells aeration is optimal.  Impression: 1.  No acute intracranial findings identified.    2.  Chronic microangiopathic ischemia and atrophy.    Electronically signed by: Elvis Kang  Date:    08/21/2022  Time:    14:40      Iggy Resendiz MD   08/28/2022

## 2022-08-29 LAB
POCT GLUCOSE: 197 MG/DL (ref 70–110)
POCT GLUCOSE: 233 MG/DL (ref 70–110)
POCT GLUCOSE: 259 MG/DL (ref 70–110)
POCT GLUCOSE: 267 MG/DL (ref 70–110)

## 2022-08-29 PROCEDURE — 25000003 PHARM REV CODE 250: Performed by: INTERNAL MEDICINE

## 2022-08-29 PROCEDURE — 97535 SELF CARE MNGMENT TRAINING: CPT | Mod: CO

## 2022-08-29 PROCEDURE — 97164 PT RE-EVAL EST PLAN CARE: CPT

## 2022-08-29 PROCEDURE — 63600175 PHARM REV CODE 636 W HCPCS: Performed by: INTERNAL MEDICINE

## 2022-08-29 PROCEDURE — 96372 THER/PROPH/DIAG INJ SC/IM: CPT | Performed by: NURSE PRACTITIONER

## 2022-08-29 PROCEDURE — 25000003 PHARM REV CODE 250: Performed by: NURSE PRACTITIONER

## 2022-08-29 PROCEDURE — 96372 THER/PROPH/DIAG INJ SC/IM: CPT | Performed by: INTERNAL MEDICINE

## 2022-08-29 PROCEDURE — 63600175 PHARM REV CODE 636 W HCPCS: Performed by: NURSE PRACTITIONER

## 2022-08-29 PROCEDURE — G0378 HOSPITAL OBSERVATION PER HR: HCPCS

## 2022-08-29 RX ORDER — INSULIN ASPART 100 [IU]/ML
8 INJECTION, SOLUTION INTRAVENOUS; SUBCUTANEOUS
Status: DISCONTINUED | OUTPATIENT
Start: 2022-08-29 | End: 2022-08-30

## 2022-08-29 RX ADMIN — CLOPIDOGREL 75 MG: 75 TABLET, FILM COATED ORAL at 05:08

## 2022-08-29 RX ADMIN — HYDROCODONE BITARTRATE AND ACETAMINOPHEN 1 TABLET: 10; 325 TABLET ORAL at 02:08

## 2022-08-29 RX ADMIN — PANTOPRAZOLE SODIUM 40 MG: 40 TABLET, DELAYED RELEASE ORAL at 09:08

## 2022-08-29 RX ADMIN — ASPIRIN 81 MG: 81 TABLET, COATED ORAL at 09:08

## 2022-08-29 RX ADMIN — HYDROCODONE BITARTRATE AND ACETAMINOPHEN 1 TABLET: 10; 325 TABLET ORAL at 04:08

## 2022-08-29 RX ADMIN — INSULIN ASPART 4 UNITS: 100 INJECTION, SOLUTION INTRAVENOUS; SUBCUTANEOUS at 05:08

## 2022-08-29 RX ADMIN — GLIMEPIRIDE 4 MG: 4 TABLET ORAL at 05:08

## 2022-08-29 RX ADMIN — INSULIN DETEMIR 40 UNITS: 100 INJECTION, SOLUTION SUBCUTANEOUS at 08:08

## 2022-08-29 RX ADMIN — AMLODIPINE BESYLATE 5 MG: 5 TABLET ORAL at 05:08

## 2022-08-29 RX ADMIN — LISINOPRIL 10 MG: 10 TABLET ORAL at 09:08

## 2022-08-29 RX ADMIN — BUPROPION HYDROCHLORIDE 150 MG: 150 TABLET, FILM COATED, EXTENDED RELEASE ORAL at 05:08

## 2022-08-29 RX ADMIN — LEVOTHYROXINE SODIUM 175 MCG: 150 TABLET ORAL at 05:08

## 2022-08-29 RX ADMIN — HYDROCODONE BITARTRATE AND ACETAMINOPHEN 1 TABLET: 10; 325 TABLET ORAL at 08:08

## 2022-08-29 RX ADMIN — RANOLAZINE 1000 MG: 500 TABLET, EXTENDED RELEASE ORAL at 08:08

## 2022-08-29 RX ADMIN — RANOLAZINE 1000 MG: 500 TABLET, EXTENDED RELEASE ORAL at 09:08

## 2022-08-29 RX ADMIN — INSULIN ASPART 6 UNITS: 100 INJECTION, SOLUTION INTRAVENOUS; SUBCUTANEOUS at 02:08

## 2022-08-29 RX ADMIN — INSULIN ASPART 3 UNITS: 100 INJECTION, SOLUTION INTRAVENOUS; SUBCUTANEOUS at 08:08

## 2022-08-29 RX ADMIN — MECLIZINE HYDROCHLORIDE 25 MG: 25 TABLET ORAL at 09:08

## 2022-08-29 NOTE — PLAN OF CARE
Problem: Skin Injury Risk Increased  Goal: Skin Health and Integrity  Outcome: Ongoing, Progressing     Problem: Adult Inpatient Plan of Care  Goal: Plan of Care Review  Outcome: Ongoing, Progressing  Goal: Patient-Specific Goal (Individualized)  Outcome: Ongoing, Progressing  Goal: Absence of Hospital-Acquired Illness or Injury  Outcome: Ongoing, Progressing  Goal: Optimal Comfort and Wellbeing  Outcome: Ongoing, Progressing  Goal: Readiness for Transition of Care  Outcome: Ongoing, Progressing

## 2022-08-29 NOTE — PROGRESS NOTES
"[facility] Hospital Medicine Progress Note        Chief Complaint: Inpatient Follow-up for Dizziness    HPI:   Mr. Ziegler is a a 72-year-old white female with history of vertigo, hypertension, insulin-dependent type 2 diabetes mellitus, hypothyroidism who presents to the ED with complaints of dizziness with two ground level falls today.  Patient has a history of vertigo and was treated for this approximately 2 weeks ago.  She states " they fixed the rocks in my ears" with resolution of symptoms however symptoms returned a few days ago and she was seen this morning at [facility] and was given  meclizine  with minimal improvement and discharged home.  When she went home her dizziness continued and she had 2 ground level falls with no head trauma or loss of consciousness and  so she returned to the ED for further evaluation.  She denies unilateral weakness, headache, vision changes, history of transient ischemic attack or stroke. She has a non-functioning spinal cord stimulator and is unable to get MRI.      Upon arrival into the ED patient remained hemodynamically stable and afebrile with an oxygen saturation of 96% on room air.  Labs were remarkable for an elevated serum creatinine, mild leukocytosis of 12,200 with a left shift, urinalysis with 2+ leuk esterase, 23 wbc's with no bacteria. She denies urinary symptoms and has chronic urinary incontinence. CT of the head was negative for acute intracranial findings.  She was given meclizine with improvement in her symptoms, however she was still unsteady on her feet and lives alone therefore Hospitalist Service asked to very promptly admitted the patient for observation. PTx was started and she was making a good clinical recovery. Awaiting rehab placement.        Interval Hx:   Patient awake and comfortable. Sitting up in the chair. States she is feeling better. If rehab and SNF deny admission then she will go home with HH and PT. Informed that " CM is working in the placement.   Objective/physical exam:  General: In no acute distress, afebrile, obese   Chest: Clear to auscultation bilaterally  Heart: RRR, +S1, S2, no appreciable murmur  Abdomen: Soft, nontender, BS +  MSK: Warm, no lower extremity edema, no clubbing or cyanosis  Neurologic: Alert and oriented x4, Cranial nerve II-XII intact, Strength 5/5 in all 4 extremities    VITAL SIGNS: 24 HRS MIN & MAX LAST   Temp  Min: 97.6 °F (36.4 °C)  Max: 98.2 °F (36.8 °C) 98 °F (36.7 °C)   BP  Min: 115/53  Max: 147/77 (!) 115/53   Pulse  Min: 68  Max: 80  79   Resp  Min: 14  Max: 18 18   SpO2  Min: 92 %  Max: 98 % 97 %       No results for input(s): WBC, RBC, HGB, HCT, MCV, MCH, MCHC, RDW, PLT, MPV, GRAN, LYMPH, MONO, BASO, NRBC in the last 168 hours.      Recent Labs   Lab 08/24/22  0406      K 4.3   CO2 20*   BUN 20.8*   CREATININE 0.82   CALCIUM 9.2          Microbiology Results (last 7 days)       Procedure Component Value Units Date/Time    Urine culture [[id number]] Collected: 08/21/22 1657    Order Status: Completed Specimen: Urine Updated: 08/23/22 0941     Urine Culture No Significant Growth             See below for Radiology    Scheduled Med:   amLODIPine  5 mg Oral Daily    aspirin  81 mg Oral Daily    buPROPion  150 mg Oral Daily    clopidogreL  75 mg Oral Daily    doxepin  20 mg Oral QHS    glimepiride  4 mg Oral Daily with breakfast    insulin aspart U-100  8 Units Subcutaneous TIDWM    insulin detemir U-100  40 Units Subcutaneous QHS    levothyroxine  175 mcg Oral Daily    lisinopriL  10 mg Oral Daily    pantoprazole  40 mg Oral Daily    polyethylene glycol  17 g Oral Daily    ranolazine  1,000 mg Oral BID        Continuous Infusions:       PRN Meds:  albuterol, dextrose 10%, dextrose 10%, glucagon (human recombinant), glucose, glucose, HYDROcodone-acetaminophen, insulin aspart U-100, meclizine, pregabalin       Assessment/Plan:  Vertigo : resolved   Anxiety disorder  Essential  hypertension  Insulin-dependent type 2 diabetes mellitus   Hypothyroidism    Plan:  Patient has no new issues. Eating well  No dizziness or weakness whe she ambulates   Cont OT/PTx     HbA1c 8, will adjust levemir to keep blood sugars <180, added lispro before each meal     Continue strict aspiration, fall and decubitus precautions     CM working on rehab vs SNF per family request     VTE prophylaxis: SCDs    Patient condition:  Fair    Anticipated discharge and Disposition:         All diagnosis and differential diagnosis have been reviewed; assessment and plan has been documented; I have personally reviewed the labs and test results that are presently available; I have reviewed the patients medication list; I have reviewed the consulting providers response and recommendations. I have reviewed or attempted to review medical records based upon their availability    All of the patient's questions have been  addressed and answered. Patient's is agreeable to the above stated plan. I will continue to monitor closely and make adjustments to medical management as needed.  _____________________________________________________________________    Nutrition Status:    Radiology:  CT Head Without Contrast  Narrative: EXAMINATION:  CT HEAD WITHOUT CONTRAST    CLINICAL HISTORY:  Dizziness, persistent/recurrent, cardiac or vascular cause suspected;    TECHNIQUE:  Sequential axial images were performed of the brain without contrast.    Dose product length of 1229 mGycm. Automated exposure control was utilized to minimize radiation dose.    COMPARISON:  None available.    FINDINGS:  There is no intracranial mass effect, midline shift, hydrocephalus or hemorrhage. There is no sulcal effacement or low attenuation changes to suggest recent large vessel territory infarction. Chronic appearing periventricular and subcortical white matter low attenuation changes are present and are consistent with chronic microangiopathic ischemia. The  ventricular system and sulcal markings prominence is consistent with atrophy. There is no acute extra axial fluid collection. Visualized paranasal sinuses are clear without mucosal thickening, polypoidal abnormality or air-fluid levels. Mastoid air cells aeration is optimal.  Impression: 1.  No acute intracranial findings identified.    2.  Chronic microangiopathic ischemia and atrophy.    Electronically signed by: Elvis Kang  Date:    08/21/2022  Time:    14:40      Iggy Resendiz MD   08/29/2022

## 2022-08-29 NOTE — PT/OT/SLP RE-EVAL
Physical Therapy Re-evaluation    Patient Name:  Awa Ziegler   MRN:  [id number]    Recommendations:     Discharge Recommendations:  rehabilitation facility   Discharge Equipment Recommendations: hip kit   Barriers to discharge:  safety awareness    Assessment:     Awa Ziegler is a 72 y.o. female admitted with a medical diagnosis of Dizziness.  She presents with the following impairments/functional limitations:  weakness, impaired endurance, impaired self care skills, impaired functional mobility, gait instability, impaired balance, decreased safety awareness. Progressing well with PT. Will need short rehab stay prior to returning home. Continue to progress patient as tolerated.    Rehab Prognosis:  good; patient would benefit from acute skilled PT services to address these deficits and reach maximum level of function.      Recent Surgery: * No surgery found *      Plan:     During this hospitalization, patient to be seen 6 x/week to address the above listed problems via gait training, therapeutic activities, therapeutic exercises, neuromuscular re-education  Plan of Care Expires:  09/30/22  Plan of Care Reviewed with: patient    Subjective     Communicated with nurse prior to session.  Patient found up in chair with telemetry upon PT entry to room, agreeable to evaluation.      Chief Complaint: none  Patient comments/goals: none  Pain/Comfort:  Pain Rating 1: 0/10    Patients cultural, spiritual, Temple conflicts given the current situation: no      Objective:     Patient found with: telemetry     General Precautions: Standard, fall   Orthopedic Precautions:N/A   Braces: N/A  Respiratory Status: Room air    Exams:  Cognitive Exam:  Patient is oriented to Person, Place, Time, and Situation  Sensation:    -       Intact  RLE ROM: WFL  RLE Strength: 4/5 grossly  LLE ROM: WFL  LLE Strength: -4/5 grossly    Functional Mobility:  Transfers:     Sit to Stand:  contact guard assistance with rolling  walker  Gait: 70 ft x 2 with RW & CGA.   Balance: fair    AM-PAC 6 CLICK MOBILITY  Total Score:18     Patient left up in chair with all lines intact and call button in reach.    GOALS:   Multidisciplinary Problems       Physical Therapy Goals          Problem: Physical Therapy    Goal Priority Disciplines Outcome Goal Variances Interventions   Physical Therapy Goal     PT, PT/OT Ongoing, Progressing     Description: Goals to be met by: 22     Patient will increase functional independence with mobility by performin. Supine to sit with Stand-by Assistance  2. Sit to supine with Stand-by Assistance  3. Sit to stand transfer with Stand-by Assistance  4. Gait  x 100 feet with Stand-by Assistance using Rolling Walker.                          History:     Past Medical History:   Diagnosis Date    Coronary artery disease     Diabetes mellitus     Hypertension     Thyroid disease        Past Surgical History:   Procedure Laterality Date    APPENDECTOMY      CHOLECYSTECTOMY      HYSTERECTOMY      TONSILLECTOMY         Time Tracking:     PT Received On: 22  PT Start Time: 937     PT Stop Time: 952  PT Total Time (min): 15 min     Billable Minutes: Re-eval 15 minutes      2022

## 2022-08-29 NOTE — PT/OT/SLP PROGRESS
Occupational Therapy  Treatment    Awa Ziegler   MRN: [id number]   Admitting Diagnosis: Dizziness    OT Date of Treatment: 08/29/22   OT Start Time: 1500  OT Stop Time: 1545  OT Total Time (min): 45 min     Billable Minutes:  Self Care/Home Management 45  Total Minutes: 45     OT/SHARON: SHARON     SHARON Visit Number: 2    General Precautions: Standard, fall  Orthopedic Precautions:    Braces:      Spiritual, Cultural Beliefs, Latter-day Practices, Values that Affect Care: no    Subjective:  Communicated with RN prior to session.         Objective:       Functional Mobility:  Bed Mobility:   Supine to sit: Standby Assistance   Sit to supine: Standby Assistance     Transfer Training:   Sit to stand:Minimal Assistance with Rolling Walker from EOB    LE Dressing:  Patient performed don/doffed pants with Maximum Assistance and with maximal effort for LB dressing dispite multipule attemptes with dressing equipment unable to successfully judith pants.    Toilet Training:  Pt performed toileting with Moderate Assistance with Toilet aide at [employer].    Patient left HOB elevated with all lines intact and call button in reach    ASSESSMENT:  Awa Ziegler is a 72 y.o. female with a medical diagnosis of Dizziness. Patient with maximal difficulty with LB dressing dispite max attempts would benefit from further tx addressing this. Patient with LB dressing in room.    Rehab potential is excellent    Activity tolerance: Excellent    Discharge recommendations: rehabilitation facility     Equipment recommendations:       GOALS:   Multidisciplinary Problems       Occupational Therapy Goals          Problem: Occupational Therapy    Goal Priority Disciplines Outcome Interventions   Occupational Therapy Goal     OT, PT/OT Ongoing, Progressing    Description: Goals to be met by: 9/8     Patient will increase functional independence with ADLs by performing:    UE Dressing with [place] including management of fasteners and buttons.  LE  Dressing with Modified [place] using AE.  Grooming while standing at sink with Modified [place].  Toileting from toilet with Modified [place] for hygiene and clothing management.   Toilet transfer to toilet with Modified [place].  Upper extremity exercise program 10 reps per handout, with independence.                         Plan:  Patient to be seen 5 x/week to address the above listed problems via self-care/home management, therapeutic activities, therapeutic exercises  Plan of Care expires: 09/08/22  Plan of Care reviewed with: patient         08/29/2022

## 2022-08-30 LAB
POCT GLUCOSE: 218 MG/DL (ref 70–110)
POCT GLUCOSE: 233 MG/DL (ref 70–110)
POCT GLUCOSE: 290 MG/DL (ref 70–110)
POCT GLUCOSE: 321 MG/DL (ref 70–110)

## 2022-08-30 PROCEDURE — 63600175 PHARM REV CODE 636 W HCPCS: Performed by: NURSE PRACTITIONER

## 2022-08-30 PROCEDURE — 25000003 PHARM REV CODE 250: Performed by: NURSE PRACTITIONER

## 2022-08-30 PROCEDURE — 97530 THERAPEUTIC ACTIVITIES: CPT | Mod: CQ

## 2022-08-30 PROCEDURE — G0378 HOSPITAL OBSERVATION PER HR: HCPCS

## 2022-08-30 PROCEDURE — 97535 SELF CARE MNGMENT TRAINING: CPT | Mod: CO

## 2022-08-30 PROCEDURE — 97116 GAIT TRAINING THERAPY: CPT | Mod: CQ

## 2022-08-30 PROCEDURE — 96372 THER/PROPH/DIAG INJ SC/IM: CPT | Performed by: NURSE PRACTITIONER

## 2022-08-30 PROCEDURE — 25000003 PHARM REV CODE 250: Performed by: INTERNAL MEDICINE

## 2022-08-30 RX ORDER — INSULIN ASPART 100 [IU]/ML
10 INJECTION, SOLUTION INTRAVENOUS; SUBCUTANEOUS
Status: DISCONTINUED | OUTPATIENT
Start: 2022-08-30 | End: 2022-09-01 | Stop reason: HOSPADM

## 2022-08-30 RX ORDER — LOPERAMIDE HYDROCHLORIDE 2 MG/1
4 CAPSULE ORAL 4 TIMES DAILY PRN
Status: DISCONTINUED | OUTPATIENT
Start: 2022-08-30 | End: 2022-09-01 | Stop reason: HOSPADM

## 2022-08-30 RX ADMIN — GLIMEPIRIDE 4 MG: 4 TABLET ORAL at 05:08

## 2022-08-30 RX ADMIN — INSULIN ASPART 8 UNITS: 100 INJECTION, SOLUTION INTRAVENOUS; SUBCUTANEOUS at 11:08

## 2022-08-30 RX ADMIN — INSULIN ASPART 4 UNITS: 100 INJECTION, SOLUTION INTRAVENOUS; SUBCUTANEOUS at 05:08

## 2022-08-30 RX ADMIN — RANOLAZINE 1000 MG: 500 TABLET, EXTENDED RELEASE ORAL at 10:08

## 2022-08-30 RX ADMIN — RANOLAZINE 1000 MG: 500 TABLET, EXTENDED RELEASE ORAL at 09:08

## 2022-08-30 RX ADMIN — LISINOPRIL 10 MG: 10 TABLET ORAL at 09:08

## 2022-08-30 RX ADMIN — AMLODIPINE BESYLATE 5 MG: 5 TABLET ORAL at 05:08

## 2022-08-30 RX ADMIN — HYDROCODONE BITARTRATE AND ACETAMINOPHEN 1 TABLET: 10; 325 TABLET ORAL at 05:08

## 2022-08-30 RX ADMIN — INSULIN ASPART 10 UNITS: 100 INJECTION, SOLUTION INTRAVENOUS; SUBCUTANEOUS at 04:08

## 2022-08-30 RX ADMIN — LOPERAMIDE HYDROCHLORIDE 4 MG: 2 CAPSULE ORAL at 11:08

## 2022-08-30 RX ADMIN — HYDROCODONE BITARTRATE AND ACETAMINOPHEN 1 TABLET: 10; 325 TABLET ORAL at 09:08

## 2022-08-30 RX ADMIN — ASPIRIN 81 MG: 81 TABLET, COATED ORAL at 09:08

## 2022-08-30 RX ADMIN — LEVOTHYROXINE SODIUM 175 MCG: 150 TABLET ORAL at 05:08

## 2022-08-30 RX ADMIN — CLOPIDOGREL 75 MG: 75 TABLET, FILM COATED ORAL at 05:08

## 2022-08-30 RX ADMIN — LOPERAMIDE HYDROCHLORIDE 4 MG: 2 CAPSULE ORAL at 04:08

## 2022-08-30 RX ADMIN — PANTOPRAZOLE SODIUM 40 MG: 40 TABLET, DELAYED RELEASE ORAL at 09:08

## 2022-08-30 RX ADMIN — BUPROPION HYDROCHLORIDE 150 MG: 150 TABLET, FILM COATED, EXTENDED RELEASE ORAL at 05:08

## 2022-08-30 NOTE — PT/OT/SLP PROGRESS
Physical Therapy Treatment    Patient Name:  Awa Ziegler   MRN:  [id number]    Recommendations:     Discharge Recommendations:  rehabilitation facility   Discharge Equipment Recommendations: hip kit   Barriers to discharge: Decreased caregiver support    Assessment:     Awa Ziegler is a 72 y.o. female admitted with a medical diagnosis of Dizziness.  She presents with the following impairments/functional limitations:  impaired functional mobility, weakness, impaired endurance, decreased lower extremity function, decreased upper extremity function, impaired balance.    Rehab Prognosis: Good; patient would benefit from acute skilled PT services to address these deficits and reach maximum level of function.    Recent Surgery: * No surgery found *      Plan:     During this hospitalization, patient to be seen 6 x/week to address the identified rehab impairments via gait training, therapeutic activities, therapeutic exercises, neuromuscular re-education and progress toward the following goals:    Plan of Care Expires:  09/30/22    Subjective     Chief Complaint: No c/o at this time.  Patient/Family Comments/goals: To go to rehab to get stronger  Pain/Comfort:  Pain Rating 1: 0/10      Objective:     Communicated with NSG prior to session.  Patient found up in chair with telemetry upon PT entry to room.     General Precautions: Standard, fall   Orthopedic Precautions:N/A   Braces: N/A  Respiratory Status: Room air     Functional Mobility:  Transfers:     Sit to Stand:  contact guard assistance with rolling walker  Gait: 80ft x2 with use of RW.  CGA provided throughout ambulation.  Pt presents with some L dropped foot and some L ER.  First trial performed w/o ACE wrap, second trial with ACE wrap.  Increased stability noted with ACE wrap.        AM-PAC 6 CLICK MOBILITY  Turning over in bed (including adjusting bedclothes, sheets and blankets)?: 3  Sitting down on and standing up from a chair with arms (e.g.,  wheelchair, bedside commode, etc.): 3  Moving from lying on back to sitting on the side of the bed?: 3  Moving to and from a bed to a chair (including a wheelchair)?: 3  Need to walk in hospital room?: 3  Climbing 3-5 steps with a railing?: 3  Basic Mobility Total Score: 18       Therapeutic Activities and Exercises:   Sit<>stand activity with L foot closer to body than R.  Pt demo'd increased difficulty with sit<>stand and required modA.  X5 trials performed.  **Pt would benefit from 3 hours of rehab to improve pt's functional independence**    Patient left up in chair with call button in reach..    GOALS:   Multidisciplinary Problems       Physical Therapy Goals          Problem: Physical Therapy    Goal Priority Disciplines Outcome Goal Variances Interventions   Physical Therapy Goal     PT, PT/OT Ongoing, Progressing     Description: Goals to be met by: 22     Patient will increase functional independence with mobility by performin. Supine to sit with Stand-by Assistance  2. Sit to supine with Stand-by Assistance  3. Sit to stand transfer with Stand-by Assistance  4. Gait  x 100 feet with Stand-by Assistance using Rolling Walker.                          Time Tracking:     PT Received On: 22  PT Start Time: 1152     PT Stop Time: 1215  PT Total Time (min): 23 min     Billable Minutes: Gait Training 15 and Therapeutic Activity 8    Treatment Type: Treatment  PT/PTA: PTA     PTA Visit Number: 1     2022

## 2022-08-30 NOTE — PROGRESS NOTES
"[facility] Hospital Medicine Progress Note        Chief Complaint: Inpatient Follow-up for Dizziness    HPI:   Mr. Ziegler is a a 72-year-old white female with history of vertigo, hypertension, insulin-dependent type 2 diabetes mellitus, hypothyroidism who presents to the ED with complaints of dizziness with two ground level falls today.  Patient has a history of vertigo and was treated for this approximately 2 weeks ago.  She states " they fixed the rocks in my ears" with resolution of symptoms however symptoms returned a few days ago and she was seen this morning at [facility] and was given  meclizine  with minimal improvement and discharged home.  When she went home her dizziness continued and she had 2 ground level falls with no head trauma or loss of consciousness and  so she returned to the ED for further evaluation.  She denies unilateral weakness, headache, vision changes, history of transient ischemic attack or stroke. She has a non-functioning spinal cord stimulator and is unable to get MRI.      Upon arrival into the ED patient remained hemodynamically stable and afebrile with an oxygen saturation of 96% on room air.  Labs were remarkable for an elevated serum creatinine, mild leukocytosis of 12,200 with a left shift, urinalysis with 2+ leuk esterase, 23 wbc's with no bacteria. She denies urinary symptoms and has chronic urinary incontinence. CT of the head was negative for acute intracranial findings.  She was given meclizine with improvement in her symptoms, however she was still unsteady on her feet and lives alone therefore Hospitalist Service asked to very promptly admitted the patient for observation. PTx was started and she was making a good clinical recovery. Awaiting rehab placement.        Interval Hx:   Patient awake and comfortable. Ambulating with a walker. No new issues.       Objective/physical exam:  General: In no acute distress, afebrile, obese   Chest: Clear to " auscultation bilaterally  Heart: RRR, +S1, S2, no appreciable murmur  Abdomen: Soft, nontender, BS +  MSK: Warm, no lower extremity edema, no clubbing or cyanosis  Neurologic: Alert and oriented x4, Cranial nerve II-XII intact, Strength 5/5 in all 4 extremities    VITAL SIGNS: 24 HRS MIN & MAX LAST   Temp  Min: 97.3 °F (36.3 °C)  Max: 98.6 °F (37 °C) 97.4 °F (36.3 °C)   BP  Min: 100/65  Max: 130/72 121/74   Pulse  Min: 65  Max: 81  71   Resp  Min: 18  Max: 19 18   SpO2  Min: 93 %  Max: 99 % 99 %       No results for input(s): WBC, RBC, HGB, HCT, MCV, MCH, MCHC, RDW, PLT, MPV, GRAN, LYMPH, MONO, BASO, NRBC in the last 168 hours.      Recent Labs   Lab 08/24/22  0406      K 4.3   CO2 20*   BUN 20.8*   CREATININE 0.82   CALCIUM 9.2          Microbiology Results (last 7 days)       ** No results found for the last 168 hours. **             See below for Radiology    Scheduled Med:   amLODIPine  5 mg Oral Daily    aspirin  81 mg Oral Daily    buPROPion  150 mg Oral Daily    clopidogreL  75 mg Oral Daily    doxepin  20 mg Oral QHS    glimepiride  4 mg Oral Daily with breakfast    insulin aspart U-100  8 Units Subcutaneous TIDWM    insulin detemir U-100  40 Units Subcutaneous QHS    levothyroxine  175 mcg Oral Daily    lisinopriL  10 mg Oral Daily    pantoprazole  40 mg Oral Daily    polyethylene glycol  17 g Oral Daily    ranolazine  1,000 mg Oral BID        Continuous Infusions:       PRN Meds:  albuterol, dextrose 10%, dextrose 10%, glucagon (human recombinant), glucose, glucose, HYDROcodone-acetaminophen, insulin aspart U-100, loperamide, meclizine, pregabalin       Assessment/Plan:  Vertigo : resolved   Anxiety disorder  Essential hypertension  Insulin-dependent type 2 diabetes mellitus   Hypothyroidism    Plan:  Patient clinically improved and feels much better.   Cont OT/PTx     HbA1c 8, will adjust levemir and lispro to keep blood sugars <180    Continue strict aspiration, fall and decubitus precautions      CM working on rehab vs SNF per family request     VTE prophylaxis: SCDs    Patient condition:  Fair    Anticipated discharge and Disposition:         All diagnosis and differential diagnosis have been reviewed; assessment and plan has been documented; I have personally reviewed the labs and test results that are presently available; I have reviewed the patients medication list; I have reviewed the consulting providers response and recommendations. I have reviewed or attempted to review medical records based upon their availability    All of the patient's questions have been  addressed and answered. Patient's is agreeable to the above stated plan. I will continue to monitor closely and make adjustments to medical management as needed.  _____________________________________________________________________    Nutrition Status:    Radiology:  CT Head Without Contrast  Narrative: EXAMINATION:  CT HEAD WITHOUT CONTRAST    CLINICAL HISTORY:  Dizziness, persistent/recurrent, cardiac or vascular cause suspected;    TECHNIQUE:  Sequential axial images were performed of the brain without contrast.    Dose product length of 1229 mGycm. Automated exposure control was utilized to minimize radiation dose.    COMPARISON:  None available.    FINDINGS:  There is no intracranial mass effect, midline shift, hydrocephalus or hemorrhage. There is no sulcal effacement or low attenuation changes to suggest recent large vessel territory infarction. Chronic appearing periventricular and subcortical white matter low attenuation changes are present and are consistent with chronic microangiopathic ischemia. The ventricular system and sulcal markings prominence is consistent with atrophy. There is no acute extra axial fluid collection. Visualized paranasal sinuses are clear without mucosal thickening, polypoidal abnormality or air-fluid levels. Mastoid air cells aeration is optimal.  Impression: 1.  No acute intracranial findings  identified.    2.  Chronic microangiopathic ischemia and atrophy.    Electronically signed by: Elvis Kang  Date:    08/21/2022  Time:    14:40      Iggy Resnediz MD   08/30/2022

## 2022-08-30 NOTE — PLAN OF CARE
Problem: Adult Inpatient Plan of Care  Goal: Plan of Care Review  Outcome: Ongoing, Progressing  Goal: Patient-Specific Goal (Individualized)  Outcome: Ongoing, Progressing     Problem: Skin Injury Risk Increased  Goal: Skin Health and Integrity  Outcome: Ongoing, Progressing

## 2022-08-30 NOTE — PT/OT/SLP PROGRESS
Occupational Therapy  Treatment    Awa Ziegler   MRN: [id number]   Admitting Diagnosis: Dizziness    OT Date of Treatment: 08/30/22   OT Start Time: 1443  OT Stop Time: 1510  OT Total Time (min): 27 min     Billable Minutes:  Self Care/Home Management 27  Total Minutes: 27     OT/SHARON: SHARON     SHARON Visit Number: 3    General Precautions: Standard, fall  Orthopedic Precautions:    Braces:      Spiritual, Cultural Beliefs, Sikhism Practices, Values that Affect Care: no    Subjective:  Communicated with RN prior to session.         Objective:       Functional Mobility:  Bed Mobility:   Supine to sit: Independent   Sit to supine: Independent   Scooting: Independent    Transfer Training:   Sit to stand:Minimal Assistance with Rolling Walker from EOB  Toilet Transfer:  Pt Step Transfer with Minimal Assistance with Rolling Walker from EOB to BR commode    LE Dressing:  Patient performed don/doffed pants with Moderate Assistance while in bed long sitting using leg  to attain figure-4 position to judith/doff pants needing max A.    Toilet Training:  Pt performed toileting with Minimal Assistance with Grab  bar at Commode.    Patient left HOB elevated with all lines intact     ASSESSMENT:  Awa Ziegler is a 72 y.o. female with a medical diagnosis of Dizziness.    Rehab potential is excellent    Activity tolerance: Excellent    Discharge recommendations: rehabilitation facility     Equipment recommendations:       GOALS:   Multidisciplinary Problems       Occupational Therapy Goals          Problem: Occupational Therapy    Goal Priority Disciplines Outcome Interventions   Occupational Therapy Goal     OT, PT/OT Ongoing, Progressing    Description: Goals to be met by: 9/8     Patient will increase functional independence with ADLs by performing:    UE Dressing with [place] including management of fasteners and buttons.  LE Dressing with Modified [place] using AE.  Grooming while standing at sink with  Modified [place].  Toileting from toilet with Modified [place] for hygiene and clothing management.   Toilet transfer to toilet with Modified [place].  Upper extremity exercise program 10 reps per handout, with independence.                         Plan:  Patient to be seen 5 x/week to address the above listed problems via self-care/home management, therapeutic activities, therapeutic exercises  Plan of Care expires: 09/08/22  Plan of Care reviewed with: patient         08/30/2022

## 2022-08-31 LAB
POCT GLUCOSE: 212 MG/DL (ref 70–110)
POCT GLUCOSE: 255 MG/DL (ref 70–110)
POCT GLUCOSE: 263 MG/DL (ref 70–110)
POCT GLUCOSE: 277 MG/DL (ref 70–110)
POCT GLUCOSE: 301 MG/DL (ref 70–110)

## 2022-08-31 PROCEDURE — 97530 THERAPEUTIC ACTIVITIES: CPT | Mod: CO

## 2022-08-31 PROCEDURE — 63600175 PHARM REV CODE 636 W HCPCS: Performed by: INTERNAL MEDICINE

## 2022-08-31 PROCEDURE — 97530 THERAPEUTIC ACTIVITIES: CPT | Mod: CQ

## 2022-08-31 PROCEDURE — 25000003 PHARM REV CODE 250: Performed by: INTERNAL MEDICINE

## 2022-08-31 PROCEDURE — 97116 GAIT TRAINING THERAPY: CPT | Mod: CQ

## 2022-08-31 PROCEDURE — 96372 THER/PROPH/DIAG INJ SC/IM: CPT | Performed by: INTERNAL MEDICINE

## 2022-08-31 PROCEDURE — G0378 HOSPITAL OBSERVATION PER HR: HCPCS

## 2022-08-31 PROCEDURE — 97110 THERAPEUTIC EXERCISES: CPT | Mod: CO

## 2022-08-31 PROCEDURE — 25000003 PHARM REV CODE 250: Performed by: NURSE PRACTITIONER

## 2022-08-31 PROCEDURE — 96372 THER/PROPH/DIAG INJ SC/IM: CPT | Performed by: NURSE PRACTITIONER

## 2022-08-31 PROCEDURE — 63600175 PHARM REV CODE 636 W HCPCS: Performed by: NURSE PRACTITIONER

## 2022-08-31 RX ORDER — MAG HYDROX/ALUMINUM HYD/SIMETH 200-200-20
30 SUSPENSION, ORAL (FINAL DOSE FORM) ORAL EVERY 6 HOURS PRN
Status: DISCONTINUED | OUTPATIENT
Start: 2022-08-31 | End: 2022-09-01 | Stop reason: HOSPADM

## 2022-08-31 RX ADMIN — INSULIN ASPART 8 UNITS: 100 INJECTION, SOLUTION INTRAVENOUS; SUBCUTANEOUS at 11:08

## 2022-08-31 RX ADMIN — RANOLAZINE 1000 MG: 500 TABLET, EXTENDED RELEASE ORAL at 09:08

## 2022-08-31 RX ADMIN — PREGABALIN 100 MG: 50 CAPSULE ORAL at 09:08

## 2022-08-31 RX ADMIN — LEVOTHYROXINE SODIUM 175 MCG: 150 TABLET ORAL at 05:08

## 2022-08-31 RX ADMIN — ALUMINUM HYDROXIDE, MAGNESIUM HYDROXIDE, AND DIMETHICONE 30 ML: 200; 20; 200 SUSPENSION ORAL at 09:08

## 2022-08-31 RX ADMIN — BUPROPION HYDROCHLORIDE 150 MG: 150 TABLET, FILM COATED, EXTENDED RELEASE ORAL at 05:08

## 2022-08-31 RX ADMIN — AMLODIPINE BESYLATE 5 MG: 5 TABLET ORAL at 05:08

## 2022-08-31 RX ADMIN — INSULIN ASPART 10 UNITS: 100 INJECTION, SOLUTION INTRAVENOUS; SUBCUTANEOUS at 05:08

## 2022-08-31 RX ADMIN — CLOPIDOGREL 75 MG: 75 TABLET, FILM COATED ORAL at 05:08

## 2022-08-31 RX ADMIN — HYDROCODONE BITARTRATE AND ACETAMINOPHEN 1 TABLET: 10; 325 TABLET ORAL at 09:08

## 2022-08-31 RX ADMIN — RANOLAZINE 1000 MG: 500 TABLET, EXTENDED RELEASE ORAL at 10:08

## 2022-08-31 RX ADMIN — PANTOPRAZOLE SODIUM 40 MG: 40 TABLET, DELAYED RELEASE ORAL at 10:08

## 2022-08-31 RX ADMIN — INSULIN ASPART 6 UNITS: 100 INJECTION, SOLUTION INTRAVENOUS; SUBCUTANEOUS at 05:08

## 2022-08-31 RX ADMIN — ALUMINUM HYDROXIDE, MAGNESIUM HYDROXIDE, AND DIMETHICONE 30 ML: 200; 20; 200 SUSPENSION ORAL at 11:08

## 2022-08-31 RX ADMIN — INSULIN DETEMIR 30 UNITS: 100 INJECTION, SOLUTION SUBCUTANEOUS at 09:08

## 2022-08-31 RX ADMIN — LISINOPRIL 10 MG: 10 TABLET ORAL at 10:08

## 2022-08-31 RX ADMIN — INSULIN ASPART 2 UNITS: 100 INJECTION, SOLUTION INTRAVENOUS; SUBCUTANEOUS at 09:08

## 2022-08-31 RX ADMIN — ASPIRIN 81 MG: 81 TABLET, COATED ORAL at 01:08

## 2022-08-31 NOTE — PROGRESS NOTES
"[facility] Hospital Medicine Progress Note        Chief Complaint: Inpatient Follow-up for Dizziness    HPI:   Mr. Ziegler is a a 72-year-old white female with history of vertigo, hypertension, insulin-dependent type 2 diabetes mellitus, hypothyroidism who presents to the ED with complaints of dizziness with two ground level falls today.  Patient has a history of vertigo and was treated for this approximately 2 weeks ago.  She states " they fixed the rocks in my ears" with resolution of symptoms however symptoms returned a few days ago and [place] [facility] [place]  [place]clizine  with minimal improvement and discharged home.  When she went home her dizziness continued and she had 2 ground level falls with no head trauma or loss of consciousness and  so she returned to the ED for further evaluation.  She denies unilateral weakness, headache, vision changes, history of transient ischemic attack or stroke. She has a non-functioning spinal cord stimulator and is unable to get MRI.   Upon arrival into the ED patient remained hemodynamically stable and afebrile with an oxygen saturation of 96% on room air.  Labs were remarkable for an elevated serum creatinine, mild leukocytosis of 12,200 with a left shift, urinalysis with 2+ leuk esterase, 23 wbc's with no bacteria. She denies urinary symptoms and has chronic urinary incontinence. CT of the head was negative for acute intracranial findings.  She was given meclizine with improvement in her symptoms, however she was still unsteady on her feet and lives alone therefore Hospitalist Service asked to very promptly admitted the patient for observation. PTx was started and she was making a good clinical recovery. Awaiting rehab placement.        Interval Hx:   Patient awake and comfortable. Ambulating with a walker.  Complained of heart burning and requesting medication for it.  Inquired if her pelvic x-ray was okay because she " fell this morning.  Informed patient that her results were not uploaded yet by the radiologist  Discussed pending placement  No family at bedside  Case discussed with patient's nurse on the floor      Objective/physical exam:  General: In no acute distress, afebrile, obese, elderly hard-of-hearing  Chest: Clear to auscultation bilaterally  Heart: RRR, +S1, S2, no appreciable murmur  Abdomen: Soft, nontender, BS +  MSK: Warm, no lower extremity edema, no clubbing or cyanosis  Neurologic: Alert and oriented x4, Cranial nerve II-XII intact, Strength 5/5 in all 4 extremities    VITAL SIGNS: 24 HRS MIN & MAX LAST   Temp  Min: 97.6 °F (36.4 °C)  Max: 98.1 °F (36.7 °C) 98 °F (36.7 °C)   BP  Min: 104/76  Max: 139/85 120/73   Pulse  Min: 71  Max: 78  77   Resp  Min: 16  Max: 18 18   SpO2  Min: 96 %  Max: 97 % 96 %       No results for input(s): WBC, RBC, HGB, HCT, MCV, MCH, MCHC, RDW, PLT, MPV, GRAN, LYMPH, MONO, BASO, NRBC in the last 168 hours.      No results for input(s): NA, K, CL, CO2, ANIONGAP, BUN, CREATININE, GLU, CALCIUM, PH, MG, ALBUMIN, PROT, ALKPHOS, ALT, AST, BILITOT in the last 168 hours.         Microbiology Results (last 7 days)       ** No results found for the last 168 hours. **             See below for Radiology    Scheduled Med:   amLODIPine  5 mg Oral Daily    aspirin  81 mg Oral Daily    buPROPion  150 mg Oral Daily    clopidogreL  75 mg Oral Daily    doxepin  20 mg Oral QHS    insulin aspart U-100  10 Units Subcutaneous TIDWM    insulin detemir U-100  45 Units Subcutaneous QHS    levothyroxine  175 mcg Oral Daily    lisinopriL  10 mg Oral Daily    pantoprazole  40 mg Oral Daily    polyethylene glycol  17 g Oral Daily    ranolazine  1,000 mg Oral BID        Continuous Infusions:       PRN Meds:  albuterol, aluminum-magnesium hydroxide-simethicone, dextrose 10%, dextrose 10%, glucagon (human recombinant), glucose, glucose, HYDROcodone-acetaminophen, insulin aspart U-100, loperamide, meclizine,  pregabalin       Assessment/Plan:  Vertigo - resolved   Anxiety disorder  Essential hypertension  Insulin-dependent type 2 diabetes mellitus   Hypothyroidism  Fall this morning       Patient clinically improved and feels much better.   Cont OT/PTx   Fell this morning, x-ray pelvic routine reviewed, degenerative changes with no acute fracture or dislocation  HbA1c 8  Requested nursing to not give nighttime snacks including juices crackers, puddings given blood sugars are running high  Change Levemir to 30 units b.i.d..  Patient was on 45 units at HS  Continue strict aspiration, fall and decubitus precautions   CM working on rehab vs SNF per family request     VTE prophylaxis: SCDs    Patient condition:  Fair    Anticipated discharge and Disposition:   SNF once accepted      All diagnosis and differential diagnosis have been reviewed; assessment and plan has been documented; I have personally reviewed the labs and test results that are presently available; I have reviewed the patients medication list; I have reviewed the consulting providers response and recommendations. I have reviewed or attempted to review medical records based upon their availability    All of the patient's questions have been  addressed and answered. Patient's is agreeable to the above stated plan. I will continue to monitor closely and make adjustments to medical management as needed.  _____________________________________________________________________    Nutrition Status:    Radiology:  X-Ray Pelvis Routine AP  Narrative: EXAMINATION:  XR PELVIS ROUTINE AP    CLINICAL HISTORY:  fall;    COMPARISON:  None.    FINDINGS:  No acute displaced fractures or dislocations.    There is some narrowing of the inferior medial aspect of both hip joints with significant degenerative changes of the lumbosacral spine and sacroiliac joints articular spaces otherwise preserved with smooth articular surfaces    No blastic or lytic lesions.    Soft tissues within  normal limits.  Impression: Degenerative changes.    Electronically signed by: Luís Wing  Date:    08/31/2022  Time:    12:28      Thomas Hill MD   08/31/2022

## 2022-08-31 NOTE — PT/OT/SLP PROGRESS
Occupational Therapy  Treatment    Awa Ziegler   MRN: [id number]   Admitting Diagnosis: Dizziness    OT Date of Treatment: 08/31/22   OT Start Time: 1449  OT Stop Time: 1500  OT Total Time (min): 11 min     Billable Minutes:  Therapeutic Exercise 11  Total Minutes: 11     OT/SHARON: SHARON     SHARON Visit Number: 3    General Precautions: Standard, fall  Orthopedic Precautions:    Braces:      Spiritual, Cultural Beliefs, Methodist Practices, Values that Affect Care: no    Subjective:  Communicated with RN prior to session.         Objective:     Patient with fall this morning stating that her neck hurts and declined ADL training at this time.   Patient participating in self ranging exercises in L UE to increase independence in ADL activities.  Sensory training through sensory bombardment using [place] towel through L UE. Patient educated on performing this task throughout the day as many times as able.    Patient left up in chair with all lines intact and call button in reach    ASSESSMENT:  Awa Ziegler is a 72 y.o. female with a medical diagnosis of Dizziness.    Rehab potential is excellent    Activity tolerance: Excellent    Discharge recommendations: rehabilitation facility     Equipment recommendations:       GOALS:   Multidisciplinary Problems       Occupational Therapy Goals          Problem: Occupational Therapy    Goal Priority Disciplines Outcome Interventions   Occupational Therapy Goal     OT, PT/OT Ongoing, Progressing    Description: Goals to be met by: 9/8     Patient will increase functional independence with ADLs by performing:    UE Dressing with [place] including management of fasteners and buttons.  LE Dressing with Modified [place] using AE.  Grooming while standing at sink with Modified [place].  Toileting from toilet with Modified [place] for hygiene and clothing management.   Toilet transfer to toilet with Modified [place].  Upper extremity exercise program 10  reps per handout, with independence.                         Plan:  Patient to be seen 5 x/week to address the above listed problems via self-care/home management, therapeutic activities, therapeutic exercises  Plan of Care expires: 09/08/22  Plan of Care reviewed with: patient         08/31/2022

## 2022-08-31 NOTE — PT/OT/SLP PROGRESS
Physical Therapy Treatment    Patient Name:  Awa Ziegler   MRN:  [id number]    Recommendations:     Discharge Recommendations:  rehabilitation facility   Discharge Equipment Recommendations: hip kit   Barriers to discharge: Decreased caregiver support    Assessment:     Awa Ziegler is a 72 y.o. female admitted with a medical diagnosis of Dizziness.  She presents with the following impairments/functional limitations:  weakness, impaired endurance, impaired functional mobility, impaired balance, decreased safety awareness, decreased lower extremity function, gait instability.    Rehab Prognosis: Good; patient would benefit from acute skilled PT services to address these deficits and reach maximum level of function.    Recent Surgery: * No surgery found *      Plan:     During this hospitalization, patient to be seen 6 x/week to address the identified rehab impairments via gait training, therapeutic activities, therapeutic exercises, neuromuscular re-education and progress toward the following goals:    Plan of Care Expires:  09/30/22    Subjective     Chief Complaint: Pt reports that she fell this morning.  Her L foot was caught b/t the sheets and she landed on the drawer of the side table.  Pt not very motivated to participate d/t this, however, with much encouragement, pt agreed.  Pain/Comfort:  Pain Rating 1: 0/10      Objective:     Communicated with NSG prior to session.  Patient found  ambulating from restroom commode to bed  with peripheral IV upon PT entry to room.     General Precautions: Standard, fall   Orthopedic Precautions:N/A   Braces: N/A  Respiratory Status: Room air     Functional Mobility:  Transfers:     Sit to Stand:  contact guard assistance with rolling walker  Toilet Transfer: contact guard assistance with  rolling walker  using  Step Transfer  Gait: 100ft x2 with use of RW.  Shoes were donned as well as ACE wrap to L foot to promote increased DF.  No LOB or unsteadiness noted  throughout ambulation with distance being limited by fatigue.      AM-PAC 6 CLICK MOBILITY  Turning over in bed (including adjusting bedclothes, sheets and blankets)?: 3  Sitting down on and standing up from a chair with arms (e.g., wheelchair, bedside commode, etc.): 3  Moving from lying on back to sitting on the side of the bed?: 3  Moving to and from a bed to a chair (including a wheelchair)?: 3  Need to walk in hospital room?: 3  Climbing 3-5 steps with a railing?: 3  Basic Mobility Total Score: 18     Patient left up in chair with call button in reach..    GOALS:   Multidisciplinary Problems       Physical Therapy Goals          Problem: Physical Therapy    Goal Priority Disciplines Outcome Goal Variances Interventions   Physical Therapy Goal     PT, PT/OT Ongoing, Progressing     Description: Goals to be met by: 22     Patient will increase functional independence with mobility by performin. Supine to sit with Stand-by Assistance  2. Sit to supine with Stand-by Assistance  3. Sit to stand transfer with Stand-by Assistance  4. Gait  x 100 feet with Stand-by Assistance using Rolling Walker.                          Time Tracking:     PT Received On: 22  PT Start Time: 748     PT Stop Time: 815  PT Total Time (min): 27 min     Billable Minutes: Gait Training 15 and Therapeutic Activity 12    Treatment Type: Treatment  PT/PTA: PTA     PTA Visit Number: 2     2022

## 2022-09-01 VITALS
HEART RATE: 74 BPM | OXYGEN SATURATION: 96 % | BODY MASS INDEX: 38.98 KG/M2 | RESPIRATION RATE: 18 BRPM | SYSTOLIC BLOOD PRESSURE: 136 MMHG | WEIGHT: 220 LBS | DIASTOLIC BLOOD PRESSURE: 84 MMHG | HEIGHT: 63 IN | TEMPERATURE: 98 F

## 2022-09-01 PROBLEM — R53.81 PHYSICAL DECONDITIONING: Status: ACTIVE | Noted: 2022-09-01

## 2022-09-01 PROBLEM — R42 DIZZINESS: Status: RESOLVED | Noted: 2022-08-21 | Resolved: 2022-09-01

## 2022-09-01 LAB
ANION GAP SERPL CALC-SCNC: 6 MEQ/L
BUN SERPL-MCNC: 29.9 MG/DL (ref 9.8–20.1)
CALCIUM SERPL-MCNC: 9.3 MG/DL (ref 8.4–10.2)
CHLORIDE SERPL-SCNC: 103 MMOL/L (ref 98–107)
CO2 SERPL-SCNC: 26 MMOL/L (ref 23–31)
CREAT SERPL-MCNC: 0.99 MG/DL (ref 0.55–1.02)
CREAT/UREA NIT SERPL: 30
GFR SERPLBLD CREATININE-BSD FMLA CKD-EPI: 60 MLS/MIN/1.73/M2
GLUCOSE SERPL-MCNC: 176 MG/DL (ref 82–115)
POCT GLUCOSE: 173 MG/DL (ref 70–110)
POCT GLUCOSE: 296 MG/DL (ref 70–110)
POTASSIUM SERPL-SCNC: 5.1 MMOL/L (ref 3.5–5.1)
SARS-COV-2 RDRP RESP QL NAA+PROBE: NEGATIVE
SODIUM SERPL-SCNC: 135 MMOL/L (ref 136–145)

## 2022-09-01 PROCEDURE — 97530 THERAPEUTIC ACTIVITIES: CPT | Mod: CQ

## 2022-09-01 PROCEDURE — 63600175 PHARM REV CODE 636 W HCPCS: Performed by: NURSE PRACTITIONER

## 2022-09-01 PROCEDURE — 25000003 PHARM REV CODE 250: Performed by: INTERNAL MEDICINE

## 2022-09-01 PROCEDURE — 97116 GAIT TRAINING THERAPY: CPT | Mod: CQ

## 2022-09-01 PROCEDURE — 80048 BASIC METABOLIC PNL TOTAL CA: CPT | Performed by: INTERNAL MEDICINE

## 2022-09-01 PROCEDURE — 87635 SARS-COV-2 COVID-19 AMP PRB: CPT | Performed by: INTERNAL MEDICINE

## 2022-09-01 PROCEDURE — G0378 HOSPITAL OBSERVATION PER HR: HCPCS

## 2022-09-01 PROCEDURE — 25000003 PHARM REV CODE 250: Performed by: NURSE PRACTITIONER

## 2022-09-01 PROCEDURE — 96372 THER/PROPH/DIAG INJ SC/IM: CPT | Performed by: NURSE PRACTITIONER

## 2022-09-01 PROCEDURE — 36415 COLL VENOUS BLD VENIPUNCTURE: CPT | Performed by: INTERNAL MEDICINE

## 2022-09-01 RX ORDER — INSULIN ASPART 100 [IU]/ML
1-10 INJECTION, SOLUTION INTRAVENOUS; SUBCUTANEOUS
Status: CANCELLED | OUTPATIENT
Start: 2022-09-01

## 2022-09-01 RX ORDER — LISINOPRIL 10 MG/1
10 TABLET ORAL DAILY
Status: CANCELLED | OUTPATIENT
Start: 2022-09-02

## 2022-09-01 RX ORDER — DOXEPIN HYDROCHLORIDE 10 MG/1
20 CAPSULE ORAL NIGHTLY
Status: CANCELLED | OUTPATIENT
Start: 2022-09-01

## 2022-09-01 RX ORDER — ALBUTEROL SULFATE 90 UG/1
1 AEROSOL, METERED RESPIRATORY (INHALATION) EVERY 6 HOURS PRN
Status: CANCELLED | OUTPATIENT
Start: 2022-09-01

## 2022-09-01 RX ORDER — BUPROPION HYDROCHLORIDE 150 MG/1
150 TABLET ORAL DAILY
Status: CANCELLED | OUTPATIENT
Start: 2022-09-02

## 2022-09-01 RX ORDER — ASPIRIN 81 MG/1
81 TABLET ORAL DAILY
Status: CANCELLED | OUTPATIENT
Start: 2022-09-02

## 2022-09-01 RX ORDER — MECLIZINE HYDROCHLORIDE 25 MG/1
25 TABLET ORAL 3 TIMES DAILY PRN
Status: CANCELLED | OUTPATIENT
Start: 2022-09-01

## 2022-09-01 RX ORDER — GLUCAGON 1 MG
1 KIT INJECTION
Status: CANCELLED | OUTPATIENT
Start: 2022-09-01

## 2022-09-01 RX ORDER — PREGABALIN 50 MG/1
100 CAPSULE ORAL 2 TIMES DAILY PRN
Status: CANCELLED | OUTPATIENT
Start: 2022-09-01

## 2022-09-01 RX ORDER — IBUPROFEN 200 MG
16 TABLET ORAL
Status: CANCELLED | OUTPATIENT
Start: 2022-09-01

## 2022-09-01 RX ORDER — IBUPROFEN 200 MG
24 TABLET ORAL
Status: CANCELLED | OUTPATIENT
Start: 2022-09-01

## 2022-09-01 RX ORDER — CLOPIDOGREL BISULFATE 75 MG/1
75 TABLET ORAL DAILY
Status: CANCELLED | OUTPATIENT
Start: 2022-09-02

## 2022-09-01 RX ORDER — RANOLAZINE 500 MG/1
1000 TABLET, EXTENDED RELEASE ORAL 2 TIMES DAILY
Status: CANCELLED | OUTPATIENT
Start: 2022-09-01

## 2022-09-01 RX ORDER — LOPERAMIDE HYDROCHLORIDE 2 MG/1
4 CAPSULE ORAL 4 TIMES DAILY PRN
Status: CANCELLED | OUTPATIENT
Start: 2022-09-01

## 2022-09-01 RX ORDER — AMLODIPINE BESYLATE 5 MG/1
5 TABLET ORAL DAILY
Status: CANCELLED | OUTPATIENT
Start: 2022-09-02

## 2022-09-01 RX ORDER — POLYETHYLENE GLYCOL 3350 17 G/17G
17 POWDER, FOR SOLUTION ORAL DAILY
Status: CANCELLED | OUTPATIENT
Start: 2022-09-02

## 2022-09-01 RX ORDER — MAG HYDROX/ALUMINUM HYD/SIMETH 200-200-20
30 SUSPENSION, ORAL (FINAL DOSE FORM) ORAL EVERY 6 HOURS PRN
Status: CANCELLED | OUTPATIENT
Start: 2022-09-01

## 2022-09-01 RX ORDER — INSULIN ASPART 100 [IU]/ML
10 INJECTION, SOLUTION INTRAVENOUS; SUBCUTANEOUS
Status: CANCELLED | OUTPATIENT
Start: 2022-09-01

## 2022-09-01 RX ORDER — HYDROCODONE BITARTRATE AND ACETAMINOPHEN 10; 325 MG/1; MG/1
1 TABLET ORAL EVERY 6 HOURS PRN
Status: CANCELLED | OUTPATIENT
Start: 2022-09-01

## 2022-09-01 RX ORDER — PANTOPRAZOLE SODIUM 40 MG/1
40 TABLET, DELAYED RELEASE ORAL DAILY
Status: CANCELLED | OUTPATIENT
Start: 2022-09-02

## 2022-09-01 RX ADMIN — LEVOTHYROXINE SODIUM 175 MCG: 150 TABLET ORAL at 06:09

## 2022-09-01 RX ADMIN — PANTOPRAZOLE SODIUM 40 MG: 40 TABLET, DELAYED RELEASE ORAL at 10:09

## 2022-09-01 RX ADMIN — CLOPIDOGREL 75 MG: 75 TABLET, FILM COATED ORAL at 06:09

## 2022-09-01 RX ADMIN — ASPIRIN 81 MG: 81 TABLET, COATED ORAL at 10:09

## 2022-09-01 RX ADMIN — ALUMINUM HYDROXIDE, MAGNESIUM HYDROXIDE, AND DIMETHICONE 30 ML: 200; 20; 200 SUSPENSION ORAL at 12:09

## 2022-09-01 RX ADMIN — INSULIN ASPART 2 UNITS: 100 INJECTION, SOLUTION INTRAVENOUS; SUBCUTANEOUS at 06:09

## 2022-09-01 RX ADMIN — LISINOPRIL 10 MG: 10 TABLET ORAL at 10:09

## 2022-09-01 RX ADMIN — RANOLAZINE 1000 MG: 500 TABLET, EXTENDED RELEASE ORAL at 10:09

## 2022-09-01 RX ADMIN — SODIUM ZIRCONIUM CYCLOSILICATE 10 G: 10 POWDER, FOR SUSPENSION ORAL at 10:09

## 2022-09-01 RX ADMIN — LOPERAMIDE HYDROCHLORIDE 4 MG: 2 CAPSULE ORAL at 12:09

## 2022-09-01 RX ADMIN — BUPROPION HYDROCHLORIDE 150 MG: 150 TABLET, FILM COATED, EXTENDED RELEASE ORAL at 06:09

## 2022-09-01 RX ADMIN — INSULIN ASPART 10 UNITS: 100 INJECTION, SOLUTION INTRAVENOUS; SUBCUTANEOUS at 12:09

## 2022-09-01 RX ADMIN — AMLODIPINE BESYLATE 5 MG: 5 TABLET ORAL at 06:09

## 2022-09-01 NOTE — PLAN OF CARE
09/01/22 1323   Final Note   Assessment Type Final Discharge Note   Anticipated Discharge Disposition SNF   Post-Acute Status   Post-Acute Authorization Placement   Post-Acute Placement Status Set-up Complete/Auth obtained  ([facility])   Discharge Delays None known at this time    van transport arranged for 3pm

## 2022-09-01 NOTE — PT/OT/SLP PROGRESS
Physical Therapy         Treatment        Awa Ziegler   MRN: [id number]     PT Received On: 09/01/22  PT Start Time: 0842     PT Stop Time: 0907    PT Total Time (min): 25 min       Billable Minutes:  Gait Lemuilll76 and Therapeutic Activity 15  Total Minutes: 25       PT/PTA: PTA     PTA Visit Number: 3       General Precautions: Standard, fall  Orthopedic Precautions: Orthopedic Precautions : N/A   Braces:      Spiritual, Cultural Beliefs, Advent Practices, Values that Affect Care: no    Objective:  Patient found in bed upon entry.    Functional Mobility:  Bed Mobility:   Supine to sit: Standby Assistance   Sit to supine: Standby Assistance   Rolling: Standby Assistance   Scooting: Standby Assistance    Balance:     Transfer Training: Min A with RW   Pt amb to bathroom commode for + void. Pt then amb to sink where she brushed her teeth in standing.     Gait Training: Min A with RW   Pt amb 114ft with step through gait pattern. Pt presents with decreased [place] step length with decreased R knee flexion during swing phase.                                Additional Treatment:    Therapeutic Exercises    BLE: hip flexion, LAQ, hip abd/add 2 X 15 reps.     Strengthening/Endurance   Pt ascended from bedside chair, amb 4ft forward then 4ft backwards then descended into bedside chair. Pt performed X 5 reps.     Activity Tolerance:  Patient tolerated treatment well    Patient left up in chair with call button in reach.    Assessment:  Awa Ziegler is a 72 y.o. female with a medical diagnosis of Dizziness.     Rehab potential is excellent.    Activity tolerance: Excellent    Discharge recommendations: Discharge Facility/Level of Care Needs: rehabilitation facility     Equipment recommendations:       GOALS:   Multidisciplinary Problems       Physical Therapy Goals          Problem: Physical Therapy    Goal Priority Disciplines Outcome Goal Variances Interventions   Physical Therapy Goal     PT, PT/OT Ongoing,  Progressing     Description: Goals to be met by: 22     Patient will increase functional independence with mobility by performin. Supine to sit with Stand-by Assistance  2. Sit to supine with Stand-by Assistance  3. Sit to stand transfer with Stand-by Assistance  4. Gait  x 100 feet with Stand-by Assistance using Rolling Walker.                          PLAN:    Patient to be seen 6 x/week  to address the above listed problems via gait training, therapeutic activities, therapeutic exercises  Plan of Care expires: 22  Plan of Care reviewed with: patient         2022

## 2022-09-01 NOTE — DISCHARGE SUMMARY
"[facility] Hospital Medicine Discharge Summary    Admit Date: 8/21/2022  Discharge Date and Time: 9/1/20229:18 AM  Admitting Physician:  Team  Discharging Physician: Thomas Hill MD.  Primary Care Physician: Va Durán MD  Consults: None    Discharge Diagnoses:  Vertigo - resolved   Anxiety disorder  Essential hypertension  Insulin-dependent type 2 diabetes mellitus   Hypothyroidism  Obesity with BMI 38    Hospital Course:   Mr. Ziegler is a a 72-year-old white female with history of vertigo, hypertension, insulin-dependent type 2 diabetes mellitus, hypothyroidism who presents to the ED with complaints of dizziness with two ground level falls today.  Patient has a history of vertigo and was treated for this approximately 2 weeks ago.  She states " they fixed the rocks in my ears" with resolution of symptoms however symptoms returned a few days ago and she [place] [facility] [place] and was given  meclizine  with minimal improvement and discharged home.  When she went home her dizziness continued and she had 2 ground level falls with no head trauma or loss of consciousness and  so she returned to the ED for further evaluation.  She denies unilateral weakness, headache, vision changes, history of transient ischemic attack or stroke. She has a non-functioning spinal cord stimulator and is unable to get MRI.   Upon arrival into the ED patient remained hemodynamically stable and afebrile with an oxygen saturation of 96% on room air.  Labs were remarkable for an elevated serum creatinine, mild leukocytosis of 12,200 with a left shift, urinalysis with 2+ leuk esterase, 23 wbc's with no bacteria. She denies urinary symptoms and has chronic urinary incontinence. CT of the head was negative for acute intracranial findings.  She was given meclizine with improvement in her symptoms, however she was still unsteady on her feet and lives alone therefore Hospitalist Service asked to very " promptly admitted the patient for observation. PTx was started and she was making a good clinical recovery. Fell 8/31- Xray pelvis done, no acute fracture or dislocation, chronic degenerative changed notes   Today noted K to the upper limit of normal, ordered lokelma 10 gms po x 1 dose   Awaiting rehab placement.   CM informed me around 9 am that pt has been accepted to [facility] and requested discharge orders   Pt was seen and examined on the day of discharge, no complaints and doing well   Vitals:  VITAL SIGNS: 24 HRS MIN & MAX LAST   Temp  Min: 97.4 °F (36.3 °C)  Max: 99.2 °F (37.3 °C) 97.6 °F (36.4 °C)   BP  Min: 98/63  Max: 132/76 113/70   Pulse  Min: 65  Max: 82  65   Resp  Min: 14  Max: 18 14   SpO2  Min: 95 %  Max: 98 % 98 %       Physical Exam:  General: In no acute distress, afebrile, obese, elderly hard-of-hearing  Chest: Clear to auscultation bilaterally  Heart: RRR, +S1, S2, no appreciable murmur  Abdomen: Soft, nontender, BS +  MSK: Warm, no lower extremity edema, no clubbing or cyanosis  Neurologic: Alert and oriented x4, Cranial nerve II-XII intact, Strength 5/5 in all 4 extremities    Procedures Performed: No admission procedures for hospital encounter.     Significant Diagnostic Studies: See Full reports for all details    No results for input(s): WBC, RBC, HGB, HCT, MCV, MCH, MCHC, RDW, PLT, MPV, GRAN, LYMPH, MONO, BASO, NRBC in the last 168 hours.    Recent Labs   Lab 09/01/22  0400   *   K 5.1   CO2 26   BUN 29.9*   CREATININE 0.99   CALCIUM 9.3        Microbiology Results (last 7 days)       ** No results found for the last 168 hours. **             X-Ray Pelvis Routine AP  Narrative: EXAMINATION:  XR PELVIS ROUTINE AP    CLINICAL HISTORY:  fall;    COMPARISON:  None.    FINDINGS:  No acute displaced fractures or dislocations.    There is some narrowing of the inferior medial aspect of both hip joints with significant degenerative changes of the lumbosacral spine and sacroiliac joints  articular spaces otherwise preserved with smooth articular surfaces    No blastic or lytic lesions.    Soft tissues within normal limits.  Impression: Degenerative changes.    Electronically signed by: Luís Wing  Date:    08/31/2022  Time:    12:28         Medication List        ASK your doctor about these medications      albuterol 90 mcg/actuation inhaler  Commonly known as: PROVENTIL/VENTOLIN HFA     amLODIPine 5 MG tablet  Commonly known as: NORVASC     aspirin 81 MG EC tablet  Commonly known as: ECOTRIN  Take 1 tablet (81 mg total) by mouth once daily.     buPROPion 150 MG TB24 tablet  Commonly known as: WELLBUTRIN XL     clopidogreL 75 mg tablet  Commonly known as: PLAVIX     doxepin 10 MG capsule  Commonly known as: SINEQUAN     ergocalciferol 50,000 unit Cap  Commonly known as: ERGOCALCIFEROL     esomeprazole 40 MG capsule  Commonly known as: NEXIUM     fluticasone propionate 50 mcg/actuation nasal spray  Commonly known as: FLONASE     glimepiride 4 MG tablet  Commonly known as: AMARYL     HYDROcodone-acetaminophen  mg per tablet  Commonly known as: NORCO     isosorbide mononitrate 30 MG 24 hr tablet  Commonly known as: IMDUR  Take 1 tablet (30 mg total) by mouth once daily.     JARDIANCE 25 mg tablet  Generic drug: empagliflozin     levocetirizine 5 MG tablet  Commonly known as: XYZAL     levothyroxine 175 MCG tablet  Commonly known as: SYNTHROID, LEVOTHROID     lisinopriL 10 MG tablet     LYUMJEV KWIKPEN U-100 INSULIN 100 unit/mL pen  Generic drug: insulin lispro-aabc     metoprolol succinate 25 MG 24 hr tablet  Commonly known as: TOPROL-XL  Take 1 tablet (25 mg total) by mouth once daily.     nabumetone 500 MG tablet  Commonly known as: RELAFEN     pantoprazole 40 MG tablet  Commonly known as: PROTONIX  Take 1 tablet (40 mg total) by mouth once daily.     pregabalin 100 MG capsule  Commonly known as: LYRICA     ranitidine 75 MG tablet  Commonly known as: ZANTAC     ranolazine 500 MG  Tb12  Commonly known as: RANEXA  Take 2 tablets (1,000 mg total) by mouth 2 (two) times daily.     TOUJEO MAX U-300 SOLOSTAR 300 unit/mL (3 mL) insulin pen  Generic drug: insulin glargine U-300 conc               Explained in detail to the patient about the discharge plan, medications, and follow-up visits. Pt understands and agrees with the treatment plan  Discharge Disposition:[facility]  Discharged Condition: stable  Diet- cardiac diabetic   Medications Per DC med rec  Activities as tolerated    For further questions contact hospitalist office    Discharge time 35 minutes    For worsening symptoms, chest pain, shortness of breath, increased abdominal pain, high grade fever, stroke or stroke like symptoms, immediately go to the nearest Emergency Room or call 911 as soon as possible.      Thomas Granados M.D on 9/1/2022. at 9:18 AM.

## 2025-04-09 ENCOUNTER — LAB REQUISITION (OUTPATIENT)
Dept: LAB | Facility: HOSPITAL | Age: 75
End: 2025-04-09
Payer: MEDICARE

## 2025-04-09 DIAGNOSIS — S90.822A BLISTER (NONTHERMAL), LEFT FOOT, INITIAL ENCOUNTER: ICD-10-CM

## 2025-04-09 LAB — GRAM STN SPEC: NORMAL

## 2025-04-09 PROCEDURE — 87070 CULTURE OTHR SPECIMN AEROBIC: CPT

## 2025-04-09 PROCEDURE — 87205 SMEAR GRAM STAIN: CPT

## 2025-04-09 PROCEDURE — 87075 CULTR BACTERIA EXCEPT BLOOD: CPT

## 2025-04-12 LAB
BACTERIA SPEC ANAEROBE CULT: NORMAL
BACTERIA WND CULT: NO GROWTH